# Patient Record
Sex: FEMALE | URBAN - METROPOLITAN AREA
[De-identification: names, ages, dates, MRNs, and addresses within clinical notes are randomized per-mention and may not be internally consistent; named-entity substitution may affect disease eponyms.]

---

## 2018-10-24 ENCOUNTER — APPOINTMENT (RX ONLY)
Dept: URBAN - METROPOLITAN AREA CLINIC 4 | Facility: CLINIC | Age: 68
Setting detail: DERMATOLOGY
End: 2018-10-24

## 2018-10-24 DIAGNOSIS — D22 MELANOCYTIC NEVI: ICD-10-CM

## 2018-10-24 DIAGNOSIS — L82.1 OTHER SEBORRHEIC KERATOSIS: ICD-10-CM

## 2018-10-24 DIAGNOSIS — H01.13 ECZEMATOUS DERMATITIS OF EYELID: ICD-10-CM

## 2018-10-24 DIAGNOSIS — D18.0 HEMANGIOMA: ICD-10-CM

## 2018-10-24 DIAGNOSIS — L81.4 OTHER MELANIN HYPERPIGMENTATION: ICD-10-CM

## 2018-10-24 PROBLEM — D22.5 MELANOCYTIC NEVI OF TRUNK: Status: ACTIVE | Noted: 2018-10-24

## 2018-10-24 PROBLEM — D22.61 MELANOCYTIC NEVI OF RIGHT UPPER LIMB, INCLUDING SHOULDER: Status: ACTIVE | Noted: 2018-10-24

## 2018-10-24 PROBLEM — D18.01 HEMANGIOMA OF SKIN AND SUBCUTANEOUS TISSUE: Status: ACTIVE | Noted: 2018-10-24

## 2018-10-24 PROBLEM — D22.62 MELANOCYTIC NEVI OF LEFT UPPER LIMB, INCLUDING SHOULDER: Status: ACTIVE | Noted: 2018-10-24

## 2018-10-24 PROBLEM — D22.71 MELANOCYTIC NEVI OF RIGHT LOWER LIMB, INCLUDING HIP: Status: ACTIVE | Noted: 2018-10-24

## 2018-10-24 PROBLEM — D22.72 MELANOCYTIC NEVI OF LEFT LOWER LIMB, INCLUDING HIP: Status: ACTIVE | Noted: 2018-10-24

## 2018-10-24 PROBLEM — H01.139 ECZEMATOUS DERMATITIS OF UNSPECIFIED EYE, UNSPECIFIED EYELID: Status: ACTIVE | Noted: 2018-10-24

## 2018-10-24 PROCEDURE — ? COUNSELING

## 2018-10-24 PROCEDURE — ? DIAGNOSIS COMMENT

## 2018-10-24 PROCEDURE — 99213 OFFICE O/P EST LOW 20 MIN: CPT

## 2018-10-24 ASSESSMENT — LOCATION ZONE DERM
LOCATION ZONE: FACE
LOCATION ZONE: ARM
LOCATION ZONE: TRUNK
LOCATION ZONE: LEG

## 2018-10-24 ASSESSMENT — LOCATION SIMPLE DESCRIPTION DERM
LOCATION SIMPLE: RIGHT THIGH
LOCATION SIMPLE: LEFT PRETIBIAL REGION
LOCATION SIMPLE: RIGHT UPPER ARM
LOCATION SIMPLE: RIGHT FOREARM
LOCATION SIMPLE: LEFT UPPER ARM
LOCATION SIMPLE: LEFT FOREARM
LOCATION SIMPLE: RIGHT LOWER BACK
LOCATION SIMPLE: LOWER BACK
LOCATION SIMPLE: RIGHT PRETIBIAL REGION
LOCATION SIMPLE: ABDOMEN
LOCATION SIMPLE: LEFT CHEEK
LOCATION SIMPLE: CHEST
LOCATION SIMPLE: LEFT THIGH

## 2018-10-24 ASSESSMENT — LOCATION DETAILED DESCRIPTION DERM
LOCATION DETAILED: LEFT VENTRAL PROXIMAL FOREARM
LOCATION DETAILED: LEFT ANTERIOR DISTAL UPPER ARM
LOCATION DETAILED: RIGHT PROXIMAL PRETIBIAL REGION
LOCATION DETAILED: RIGHT SUPERIOR MEDIAL MIDBACK
LOCATION DETAILED: RIGHT LATERAL ABDOMEN
LOCATION DETAILED: SUPERIOR LUMBAR SPINE
LOCATION DETAILED: RIGHT MEDIAL SUPERIOR CHEST
LOCATION DETAILED: EPIGASTRIC SKIN
LOCATION DETAILED: LEFT ANTERIOR LATERAL DISTAL THIGH
LOCATION DETAILED: RIGHT VENTRAL PROXIMAL FOREARM
LOCATION DETAILED: LEFT PROXIMAL PRETIBIAL REGION
LOCATION DETAILED: LEFT ANTERIOR PROXIMAL THIGH
LOCATION DETAILED: RIGHT ANTERIOR PROXIMAL THIGH
LOCATION DETAILED: RIGHT ANTERIOR DISTAL THIGH
LOCATION DETAILED: RIGHT ANTERIOR PROXIMAL UPPER ARM
LOCATION DETAILED: RIGHT ANTERIOR DISTAL UPPER ARM
LOCATION DETAILED: LEFT INFERIOR MEDIAL MALAR CHEEK
LOCATION DETAILED: LEFT ANTERIOR PROXIMAL UPPER ARM

## 2018-10-24 NOTE — HPI: RASH
How Severe Is Your Rash?: mild
Is This A New Presentation, Or A Follow-Up?: Follow Up Rash
Additional History: PT states not clinically presenting, also DX’d prev as Eyelid Derm. Denies any flares until she wears eye make-up. Desonide helps when she flares.\\n\\nPt also notes left eyelid lesion recurred after Dr. Copeland removed it. Reports it was a cosmetic removal - Pt unclear of removal method.

## 2018-10-24 NOTE — PROCEDURE: COUNSELING
Detail Level: Zone
Patient Specific Counseling (Will Not Stick From Patient To Patient): Suggested Bare Minerals make up as option, cannot necessarily rec best option due to not knowing true allergen

## 2019-03-07 ENCOUNTER — APPOINTMENT (RX ONLY)
Dept: URBAN - METROPOLITAN AREA CLINIC 4 | Facility: CLINIC | Age: 69
Setting detail: DERMATOLOGY
End: 2019-03-07

## 2019-03-07 DIAGNOSIS — D22 MELANOCYTIC NEVI: ICD-10-CM

## 2019-03-07 PROBLEM — D48.5 NEOPLASM OF UNCERTAIN BEHAVIOR OF SKIN: Status: ACTIVE | Noted: 2019-03-07

## 2019-03-07 PROCEDURE — 11102 TANGNTL BX SKIN SINGLE LES: CPT

## 2019-03-07 PROCEDURE — ? BIOPSY BY SHAVE METHOD

## 2019-03-07 ASSESSMENT — LOCATION SIMPLE DESCRIPTION DERM: LOCATION SIMPLE: LEFT SUPERIOR EYELID

## 2019-03-07 ASSESSMENT — LOCATION ZONE DERM: LOCATION ZONE: EYELID

## 2019-03-07 ASSESSMENT — LOCATION DETAILED DESCRIPTION DERM: LOCATION DETAILED: LEFT LATERAL SUPERIOR EYELID

## 2019-03-07 NOTE — PROCEDURE: BIOPSY BY SHAVE METHOD
Notification Instructions: Patient will be notified of biopsy results. However, patient instructed to call the office if not contacted within 2 weeks.
Detail Level: Detailed
Lab: -[id number]
Bill For Surgical Tray: no
Curettage Text: The wound bed was treated with curettage after the biopsy was performed.
Consent: Verbal consent was obtained and risks were reviewed including but not limited to scarring, infection, bleeding, scabbing, incomplete removal, nerve damage and allergy to anesthesia.
Lab Facility: [id number]
Cryotherapy Text: The wound bed was treated with cryotherapy after the biopsy was performed.
Additional Anesthesia Volume In Cc (Will Not Render If 0): 0
Anesthesia Type: 1% lidocaine with epinephrine
Wound Care: Petrolatum
Electrodesiccation Text: The wound bed was treated with electrodesiccation after the biopsy was performed.
Depth Of Biopsy: dermis
Biopsy Type: H and E
Electrodesiccation And Curettage Text: The wound bed was treated with electrodesiccation and curettage after the biopsy was performed.
Billing Type: Third-Party Bill
Type Of Destruction Used: Curettage
Anesthesia Volume In Cc (Will Not Render If 0): 0.5
Post-Care Instructions: I reviewed with the patient in detail post-care instructions. Patient is to keep the biopsy site dry overnight, and then apply vaseline or aquaphor ointment along with a bandaid twice daily until healed.
Dressing: bandage
Was A Bandage Applied: Yes
Silver Nitrate Text: The wound bed was treated with silver nitrate after the biopsy was performed.
Biopsy Method: sterile single edge surgical blade
Hemostasis: Drysol

## 2019-03-07 NOTE — HPI: SKIN LESION
How Severe Is Your Skin Lesion?: mild
Has Your Skin Lesion Been Treated?: been treated
Is This A New Presentation, Or A Follow-Up?: Skin Lesion
Additional History: Patient states has lesion removed years ago by Dr. Copeland for cosmetic reasons and is now growing back.

## 2021-06-21 ENCOUNTER — APPOINTMENT (RX ONLY)
Dept: URBAN - METROPOLITAN AREA CLINIC 4 | Facility: CLINIC | Age: 71
Setting detail: DERMATOLOGY
End: 2021-06-21

## 2021-06-21 DIAGNOSIS — H01.13 ECZEMATOUS DERMATITIS OF EYELID: ICD-10-CM

## 2021-06-21 DIAGNOSIS — T07XXXA INSECT BITE, NONVENOMOUS, OF OTHER, MULTIPLE, AND UNSPECIFIED SITES, WITHOUT MENTION OF INFECTION: ICD-10-CM

## 2021-06-21 PROBLEM — H01.131 ECZEMATOUS DERMATITIS OF RIGHT UPPER EYELID: Status: ACTIVE | Noted: 2021-06-21

## 2021-06-21 PROBLEM — S80.861A INSECT BITE (NONVENOMOUS), RIGHT LOWER LEG, INITIAL ENCOUNTER: Status: ACTIVE | Noted: 2021-06-21

## 2021-06-21 PROBLEM — H01.135 ECZEMATOUS DERMATITIS OF LEFT LOWER EYELID: Status: ACTIVE | Noted: 2021-06-21

## 2021-06-21 PROBLEM — H01.134 ECZEMATOUS DERMATITIS OF LEFT UPPER EYELID: Status: ACTIVE | Noted: 2021-06-21

## 2021-06-21 PROBLEM — H01.139 ECZEMATOUS DERMATITIS OF UNSPECIFIED EYE, UNSPECIFIED EYELID: Status: ACTIVE | Noted: 2021-06-21

## 2021-06-21 PROCEDURE — ? PRESCRIPTION

## 2021-06-21 PROCEDURE — ? DIAGNOSIS COMMENT

## 2021-06-21 PROCEDURE — ? PRESCRIPTION MEDICATION MANAGEMENT

## 2021-06-21 PROCEDURE — ? COUNSELING

## 2021-06-21 PROCEDURE — 99213 OFFICE O/P EST LOW 20 MIN: CPT

## 2021-06-21 RX ORDER — DESONIDE 0.5 MG/G
CREAM TOPICAL BID
Qty: 1 | Refills: 0 | COMMUNITY
Start: 2021-06-21

## 2021-06-21 RX ADMIN — DESONIDE: 0.5 CREAM TOPICAL at 00:00

## 2021-06-21 ASSESSMENT — LOCATION ZONE DERM
LOCATION ZONE: EYELID
LOCATION ZONE: FACE
LOCATION ZONE: LEG

## 2021-06-21 ASSESSMENT — LOCATION DETAILED DESCRIPTION DERM
LOCATION DETAILED: RIGHT LATERAL SUPERIOR EYELID
LOCATION DETAILED: LEFT LATERAL INFERIOR PRESEPTAL REGION
LOCATION DETAILED: RIGHT DISTAL PRETIBIAL REGION
LOCATION DETAILED: RIGHT SUPERIOR CENTRAL MALAR CHEEK
LOCATION DETAILED: LEFT LATERAL SUPERIOR EYELID

## 2021-06-21 ASSESSMENT — LOCATION SIMPLE DESCRIPTION DERM
LOCATION SIMPLE: LEFT SUPERIOR EYELID
LOCATION SIMPLE: RIGHT CHEEK
LOCATION SIMPLE: LEFT INFERIOR EYELID
LOCATION SIMPLE: RIGHT PRETIBIAL REGION
LOCATION SIMPLE: RIGHT SUPERIOR EYELID

## 2021-06-21 NOTE — HPI: SKIN LESION
What Type Of Note Output Would You Prefer (Optional)?: Bullet Format
How Severe Is Your Skin Lesion?: mild
Has Your Skin Lesion Been Treated?: not been treated
Is This A New Presentation, Or A Follow-Up?: Skin Lesion
Additional History: Pt thinks she had a bug bite. She is concerned that lesion hasn’t fully healed.

## 2021-06-21 NOTE — HPI: RASH
What Type Of Note Output Would You Prefer (Optional)?: Bullet Format
How Severe Is Your Rash?: mild
Is This A New Presentation, Or A Follow-Up?: Rash
Additional History: Pt saw her ophthalmologist 2 weeks ago and was prescribed alaway. She states her doctor though it was due to a pollen allergy. Pt states she tried an old  desonide cream on her eyelids for previous diagnosis of eyelid dermatitis in 2018. She notes the rash began after her eyes were frequently tearing up. She notes she has dry eye and uses refresh eye drops and systane eye drops. Eyelid issues seemed to start after start of these eye drops.

## 2021-06-21 NOTE — PROCEDURE: DIAGNOSIS COMMENT
Render Risk Assessment In Note?: no
Comment: RTC if the lesion does not resolve after 1 month.
Detail Level: Simple
Comment: Suspect secondary to propylene glycol (systane) or benzalkonium chloride (alaway)

## 2021-06-21 NOTE — PROCEDURE: PRESCRIPTION MEDICATION MANAGEMENT
Continue Regimen: Refresh eye drops
Discontinue Regimen: Systane eye drops, alaway eye drops
Render In Strict Bullet Format?: No
Plan: F/u if not improved after 2 weeks
Initiate Treatment: Desonide BID x 1 week then QOD x 1 week then stop
Detail Level: Simple

## 2021-08-18 ENCOUNTER — APPOINTMENT (RX ONLY)
Dept: URBAN - METROPOLITAN AREA CLINIC 4 | Facility: CLINIC | Age: 71
Setting detail: DERMATOLOGY
End: 2021-08-18

## 2021-08-18 DIAGNOSIS — L57.0 ACTINIC KERATOSIS: ICD-10-CM

## 2021-08-18 DIAGNOSIS — H01.13 ECZEMATOUS DERMATITIS OF EYELID: ICD-10-CM

## 2021-08-18 DIAGNOSIS — L81.4 OTHER MELANIN HYPERPIGMENTATION: ICD-10-CM

## 2021-08-18 DIAGNOSIS — L82.1 OTHER SEBORRHEIC KERATOSIS: ICD-10-CM

## 2021-08-18 DIAGNOSIS — L82.0 INFLAMED SEBORRHEIC KERATOSIS: ICD-10-CM

## 2021-08-18 DIAGNOSIS — D22 MELANOCYTIC NEVI: ICD-10-CM

## 2021-08-18 PROBLEM — D22.72 MELANOCYTIC NEVI OF LEFT LOWER LIMB, INCLUDING HIP: Status: ACTIVE | Noted: 2021-08-18

## 2021-08-18 PROBLEM — H01.139 ECZEMATOUS DERMATITIS OF UNSPECIFIED EYE, UNSPECIFIED EYELID: Status: ACTIVE | Noted: 2021-08-18

## 2021-08-18 PROBLEM — D22.71 MELANOCYTIC NEVI OF RIGHT LOWER LIMB, INCLUDING HIP: Status: ACTIVE | Noted: 2021-08-18

## 2021-08-18 PROBLEM — D22.62 MELANOCYTIC NEVI OF LEFT UPPER LIMB, INCLUDING SHOULDER: Status: ACTIVE | Noted: 2021-08-18

## 2021-08-18 PROBLEM — D22.5 MELANOCYTIC NEVI OF TRUNK: Status: ACTIVE | Noted: 2021-08-18

## 2021-08-18 PROBLEM — D22.61 MELANOCYTIC NEVI OF RIGHT UPPER LIMB, INCLUDING SHOULDER: Status: ACTIVE | Noted: 2021-08-18

## 2021-08-18 PROCEDURE — 99213 OFFICE O/P EST LOW 20 MIN: CPT | Mod: 25

## 2021-08-18 PROCEDURE — ? PRESCRIPTION MEDICATION MANAGEMENT

## 2021-08-18 PROCEDURE — ? PRESCRIPTION

## 2021-08-18 PROCEDURE — ? LIQUID NITROGEN

## 2021-08-18 PROCEDURE — 17110 DESTRUCTION B9 LES UP TO 14: CPT

## 2021-08-18 PROCEDURE — 17000 DESTRUCT PREMALG LESION: CPT | Mod: 59

## 2021-08-18 PROCEDURE — ? DIAGNOSIS COMMENT

## 2021-08-18 PROCEDURE — ? COUNSELING

## 2021-08-18 RX ORDER — TACROLIMUS 1 MG/G
OINTMENT TOPICAL
Qty: 30 | Refills: 2 | Status: ERX | COMMUNITY
Start: 2021-08-18

## 2021-08-18 RX ADMIN — TACROLIMUS: 1 OINTMENT TOPICAL at 00:00

## 2021-08-18 ASSESSMENT — LOCATION DETAILED DESCRIPTION DERM
LOCATION DETAILED: RIGHT ANTERIOR PROXIMAL UPPER ARM
LOCATION DETAILED: LEFT ANTERIOR DISTAL UPPER ARM
LOCATION DETAILED: LEFT ANTERIOR PROXIMAL UPPER ARM
LOCATION DETAILED: RIGHT ANTERIOR DISTAL THIGH
LOCATION DETAILED: LEFT PROXIMAL PRETIBIAL REGION
LOCATION DETAILED: RIGHT MEDIAL UPPER BACK
LOCATION DETAILED: RIGHT SUPERIOR MEDIAL MIDBACK
LOCATION DETAILED: LEFT INFERIOR MEDIAL MALAR CHEEK
LOCATION DETAILED: RIGHT VENTRAL PROXIMAL FOREARM
LOCATION DETAILED: EPIGASTRIC SKIN
LOCATION DETAILED: RIGHT LATERAL ABDOMEN
LOCATION DETAILED: RIGHT ANTERIOR DISTAL UPPER ARM
LOCATION DETAILED: RIGHT PROXIMAL PRETIBIAL REGION
LOCATION DETAILED: RIGHT CRUS OF HELIX
LOCATION DETAILED: RIGHT ANTERIOR PROXIMAL THIGH
LOCATION DETAILED: LEFT INFERIOR MEDIAL UPPER BACK
LOCATION DETAILED: LEFT ANTERIOR LATERAL DISTAL THIGH
LOCATION DETAILED: LEFT INFERIOR UPPER BACK
LOCATION DETAILED: LEFT VENTRAL PROXIMAL FOREARM
LOCATION DETAILED: LEFT ANTERIOR PROXIMAL THIGH
LOCATION DETAILED: RIGHT MEDIAL SUPERIOR CHEST

## 2021-08-18 ASSESSMENT — LOCATION ZONE DERM
LOCATION ZONE: EAR
LOCATION ZONE: TRUNK
LOCATION ZONE: LEG
LOCATION ZONE: ARM
LOCATION ZONE: FACE

## 2021-08-18 ASSESSMENT — LOCATION SIMPLE DESCRIPTION DERM
LOCATION SIMPLE: RIGHT THIGH
LOCATION SIMPLE: RIGHT FOREARM
LOCATION SIMPLE: LEFT THIGH
LOCATION SIMPLE: CHEST
LOCATION SIMPLE: RIGHT LOWER BACK
LOCATION SIMPLE: LEFT PRETIBIAL REGION
LOCATION SIMPLE: RIGHT EAR
LOCATION SIMPLE: RIGHT UPPER ARM
LOCATION SIMPLE: RIGHT PRETIBIAL REGION
LOCATION SIMPLE: LEFT UPPER ARM
LOCATION SIMPLE: RIGHT UPPER BACK
LOCATION SIMPLE: ABDOMEN
LOCATION SIMPLE: LEFT FOREARM
LOCATION SIMPLE: LEFT CHEEK
LOCATION SIMPLE: LEFT UPPER BACK

## 2021-08-18 NOTE — HPI: ITCHING
How Did Your Itching Occur?: gradual in onset  (over a period of years)
Additional History: Pt uses Dove soap

## 2021-08-18 NOTE — PROCEDURE: PRESCRIPTION MEDICATION MANAGEMENT
Plan: Follow up in 6 weeks if persists \\nF/u Ophtho for management of frequent tearing- suspect irritant contact dermatitis
Initiate Treatment: Tacrolimus ointment Monday - Friday
Modify Regimen: Desonide cream Sat & Sun for 1 month\\nContinue to avoid benzalkonium chloride and propylene glycol
Render In Strict Bullet Format?: No
Detail Level: Simple

## 2021-08-18 NOTE — PROCEDURE: COUNSELING
Detail Level: Zone
Patient Specific Counseling (Will Not Stick From Patient To Patient): Suggested Bare Minerals make up as option, cannot necessarily rec best option due to not knowing true allergen
Detail Level: Detailed

## 2021-08-18 NOTE — PROCEDURE: LIQUID NITROGEN
Show Applicator Variable?: Yes
Consent: The patient's consent was obtained including but not limited to risks of crusting, scabbing, blistering, scarring, darker or lighter pigmentary change, recurrence, incomplete removal and infection.
Detail Level: Detailed
Post-Care Instructions: I reviewed with the patient in detail post-care instructions. Patient is to wear sunprotection, and avoid picking at any of the treated lesions. Pt may apply Vaseline to crusted or scabbing areas. I have advised Pt to return to the office in 4 weeks if lesions persist.
Duration Of Freeze Thaw-Cycle (Seconds): 0
Render Note In Bullet Format When Appropriate: No
Medical Necessity Information: It is in your best interest to select a reason for this procedure from the list below. All of these items fulfill various CMS LCD requirements except the new and changing color options.
Post-Care Instructions: I reviewed with the patient in detail post-care instructions. Patient is to wear sunprotection, and avoid picking at any of the treated lesions. Pt may apply Vaseline to crusted or scabbing areas.
Medical Necessity Clause: This procedure was medically necessary because the lesions that were treated were:

## 2021-08-18 NOTE — PROCEDURE: DIAGNOSIS COMMENT
Comment: Including lesion of concern in R ear
Detail Level: Simple
Render Risk Assessment In Note?: no
Comment: Including itchy and irritating concerns on back

## 2022-08-18 ENCOUNTER — APPOINTMENT (RX ONLY)
Dept: URBAN - METROPOLITAN AREA CLINIC 4 | Facility: CLINIC | Age: 72
Setting detail: DERMATOLOGY
End: 2022-08-18

## 2022-08-18 DIAGNOSIS — L81.4 OTHER MELANIN HYPERPIGMENTATION: ICD-10-CM

## 2022-08-18 DIAGNOSIS — D22 MELANOCYTIC NEVI: ICD-10-CM

## 2022-08-18 DIAGNOSIS — L82.1 OTHER SEBORRHEIC KERATOSIS: ICD-10-CM

## 2022-08-18 DIAGNOSIS — Z00.00 ENCOUNTER FOR GENERAL ADULT MEDICAL EXAMINATION WITHOUT ABNORMAL FINDINGS: ICD-10-CM

## 2022-08-18 DIAGNOSIS — H01.13 ECZEMATOUS DERMATITIS OF EYELID: ICD-10-CM | Status: RESOLVED

## 2022-08-18 PROBLEM — D22.72 MELANOCYTIC NEVI OF LEFT LOWER LIMB, INCLUDING HIP: Status: ACTIVE | Noted: 2022-08-18

## 2022-08-18 PROBLEM — D22.5 MELANOCYTIC NEVI OF TRUNK: Status: ACTIVE | Noted: 2022-08-18

## 2022-08-18 PROBLEM — D22.71 MELANOCYTIC NEVI OF RIGHT LOWER LIMB, INCLUDING HIP: Status: ACTIVE | Noted: 2022-08-18

## 2022-08-18 PROBLEM — D22.61 MELANOCYTIC NEVI OF RIGHT UPPER LIMB, INCLUDING SHOULDER: Status: ACTIVE | Noted: 2022-08-18

## 2022-08-18 PROBLEM — D22.62 MELANOCYTIC NEVI OF LEFT UPPER LIMB, INCLUDING SHOULDER: Status: ACTIVE | Noted: 2022-08-18

## 2022-08-18 PROBLEM — H01.139 ECZEMATOUS DERMATITIS OF UNSPECIFIED EYE, UNSPECIFIED EYELID: Status: ACTIVE | Noted: 2022-08-18

## 2022-08-18 PROCEDURE — ? SUNSCREEN RECOMMENDATIONS

## 2022-08-18 PROCEDURE — 99213 OFFICE O/P EST LOW 20 MIN: CPT

## 2022-08-18 PROCEDURE — ? COUNSELING

## 2022-08-18 PROCEDURE — ? DIAGNOSIS COMMENT

## 2022-08-18 ASSESSMENT — LOCATION SIMPLE DESCRIPTION DERM
LOCATION SIMPLE: LEFT CHEEK
LOCATION SIMPLE: LEFT UPPER ARM
LOCATION SIMPLE: LEFT THIGH
LOCATION SIMPLE: RIGHT PRETIBIAL REGION
LOCATION SIMPLE: RIGHT THIGH
LOCATION SIMPLE: ABDOMEN
LOCATION SIMPLE: CHEST
LOCATION SIMPLE: RIGHT LOWER BACK
LOCATION SIMPLE: LEFT PRETIBIAL REGION
LOCATION SIMPLE: RIGHT UPPER ARM
LOCATION SIMPLE: LEFT FOREARM
LOCATION SIMPLE: RIGHT FOREARM

## 2022-08-18 ASSESSMENT — LOCATION DETAILED DESCRIPTION DERM
LOCATION DETAILED: RIGHT ANTERIOR DISTAL UPPER ARM
LOCATION DETAILED: LEFT ANTERIOR PROXIMAL UPPER ARM
LOCATION DETAILED: LEFT ANTERIOR DISTAL UPPER ARM
LOCATION DETAILED: EPIGASTRIC SKIN
LOCATION DETAILED: RIGHT PROXIMAL PRETIBIAL REGION
LOCATION DETAILED: LEFT ANTERIOR PROXIMAL THIGH
LOCATION DETAILED: RIGHT ANTERIOR DISTAL THIGH
LOCATION DETAILED: RIGHT MEDIAL SUPERIOR CHEST
LOCATION DETAILED: LEFT ANTERIOR LATERAL DISTAL THIGH
LOCATION DETAILED: LEFT VENTRAL PROXIMAL FOREARM
LOCATION DETAILED: RIGHT LATERAL ABDOMEN
LOCATION DETAILED: RIGHT SUPERIOR MEDIAL MIDBACK
LOCATION DETAILED: LEFT INFERIOR MEDIAL MALAR CHEEK
LOCATION DETAILED: RIGHT ANTERIOR PROXIMAL UPPER ARM
LOCATION DETAILED: RIGHT ANTERIOR PROXIMAL THIGH
LOCATION DETAILED: RIGHT VENTRAL PROXIMAL FOREARM
LOCATION DETAILED: LEFT PROXIMAL PRETIBIAL REGION

## 2022-08-18 ASSESSMENT — LOCATION ZONE DERM
LOCATION ZONE: TRUNK
LOCATION ZONE: FACE
LOCATION ZONE: LEG
LOCATION ZONE: ARM

## 2022-08-18 NOTE — PROCEDURE: DIAGNOSIS COMMENT
Render Risk Assessment In Note?: no
Comment: Referred patient to PCM, suspect musculoskeletal in nature. Including area of concern.
Detail Level: Simple

## 2022-08-18 NOTE — PROCEDURE: MIPS QUALITY
Quality 111:Pneumonia Vaccination Status For Older Adults: Pneumococcal vaccine administered on or after patient’s 60th birthday and before the end of the measurement period
Quality 130: Documentation Of Current Medications In The Medical Record: Current Medications Documented
Detail Level: Detailed
Quality 110: Preventive Care And Screening: Influenza Immunization: Influenza Immunization previously received during influenza season
Quality 226: Preventive Care And Screening: Tobacco Use: Screening And Cessation Intervention: Tobacco Screening not Performed for Medical Reasons

## 2022-08-18 NOTE — HPI: SKIN LESION
What Type Of Note Output Would You Prefer (Optional)?: Bullet Format
How Severe Is Your Skin Lesion?: mild
Has Your Skin Lesion Been Treated?: not been treated
Is This A New Presentation, Or A Follow-Up?: Skin Lesion
Additional History: Pt noticed that toe is sore. No lesions.

## 2024-09-10 ENCOUNTER — PRE-ADMISSION TESTING (OUTPATIENT)
Dept: PREADMISSION TESTING | Age: 74
End: 2024-09-10
Payer: MEDICARE

## 2024-09-10 ENCOUNTER — TRANSCRIBE ORDERS (OUTPATIENT)
Dept: PREADMISSION TESTING | Facility: HOSPITAL | Age: 74
End: 2024-09-10

## 2024-09-10 VITALS — WEIGHT: 147 LBS | BODY MASS INDEX: 25.1 KG/M2 | HEIGHT: 64 IN

## 2024-09-10 DIAGNOSIS — M16.12 UNILATERAL PRIMARY OSTEOARTHRITIS, LEFT HIP: Primary | ICD-10-CM

## 2024-09-10 DIAGNOSIS — Z01.818 ENCOUNTER FOR OTHER PREPROCEDURAL EXAMINATION: ICD-10-CM

## 2024-09-10 RX ORDER — ROSUVASTATIN CALCIUM 10 MG/1
10 TABLET, COATED ORAL
COMMUNITY
Start: 2024-07-15

## 2024-09-10 RX ORDER — ESCITALOPRAM OXALATE 5 MG/1
5 TABLET ORAL EVERY MORNING
COMMUNITY

## 2024-09-10 NOTE — PRE-PROCEDURE INSTRUCTIONS
[facility]  [address] [place] [address]    1.       Admissions will call you with your arrival time on  9/16 (day prior to surgery) between 2pm-4pm.  For questions about your arrival time, please call [phone].    2.       On the day of your procedure please report to the [place] campus in the [place]. Please arrive through the [place] [facility].  If you are parking in the [facility] [address] [facility], come to the ground floor of the garage and follow signs to the [facility].  After being screened, please report to either the Outpatient Registration for Pre-Admission Testing or to the Surgery Registration Desk.    3. Please follow the following fasting guidelines:     No solid food EIGHT HOURS prior to arrival time on day of surgery.  6 ounces of clear liquids, meaning water or PLAIN black coffee WITHOUT any milk, cream, sugar, or sweetener are permitted up to TWO HOURS prior to arrival at the hospital.    4. Please take ONLY the following medications with a sip of water on the morning of your procedure: (populate names and/or NONE)  escitalopram    5. Other Instructions: You may brush your teeth the morning of the procedure. Rinse and spit, do not swallow.  Bring a list of your medications with dosages.  Use surgical wash as directed.     6. If you develop a cold, cough, fever, rash, or other symptom prior to the day of the procedure, please report it to your physician immediately.    7. If you need to cancel the procedure for any reason, please contact your physician.    8. Make arrangements to have safe transportation home accompanied by a responsible adult. If you have not arranged safe transportation home, your surgery will be cancelled. Safe transportation may include private vehicle, ride-share service, taxi and public transportation when accompanied by a responsible adult who will assist you home. A responsible adult is someone known to you and does not include the  taxi, ride-share or public transit drive transporting you.    9.  If it is medically necessary for you to have a longer stay, you will be informed as soon as the decision is made.    10. Only bring essential items to the hospital.  Do not wear or bring anything of value to the hospital including jewelry of any kind, money, or wallet. Do not wear make-up or contact lenses.  DO NOT BRING MEDICATIONS FROM HOME unless instructed to do so. DO bring your hearing aids, glasses, and a case    11. No lotion, creams, powders, or oils on skin the morning of procedure     12. Dress in comfortable clothes.    13.  If instructed, please bring a copy of your Advanced Directive (Living Will/Durable Power of ) on the day of your procedure.     14. Ensuring your safety at all times is a very important part of our [facility] Culture of Safety. After having surgery and sedation, you are at risk for falling and balance issues. Although you may feel awake, the effects of the medication can last up to 24 hours after anesthesia. If you need to use the bathroom during your recovery period, nursing staff will escort you there and stay with you to ensure your safety.    15. Refrain from drinking alcohol and smoking cigarettes for 24 hours prior to surgery.    16. Shower with antibacterial soap (Dial) the night before and morning of your procedure.  If your procedure indicates the need for CHG antiseptic wash (Bactoshield or Hibiclens), please use this instead and follow instructions as discussed at the time of your Pre-Admission Testing phone interview or visit.    Above instructions reviewed with patient and patient acknowledges understanding.    Form explained by: Fany Geronimo RN

## 2024-09-11 ENCOUNTER — PRE-ADMISSION TESTING (OUTPATIENT)
Dept: PREADMISSION TESTING | Facility: HOSPITAL | Age: 74
End: 2024-09-11
Attending: ORTHOPAEDIC SURGERY
Payer: MEDICARE

## 2024-09-11 ENCOUNTER — HOSPITAL ENCOUNTER (OUTPATIENT)
Dept: CARDIOLOGY | Facility: HOSPITAL | Age: 74
Discharge: HOME | End: 2024-09-11
Attending: ORTHOPAEDIC SURGERY
Payer: MEDICARE

## 2024-09-11 VITALS
WEIGHT: 150.1 LBS | SYSTOLIC BLOOD PRESSURE: 158 MMHG | HEIGHT: 64 IN | HEART RATE: 68 BPM | RESPIRATION RATE: 16 BRPM | TEMPERATURE: 97.5 F | BODY MASS INDEX: 25.62 KG/M2 | DIASTOLIC BLOOD PRESSURE: 78 MMHG | OXYGEN SATURATION: 98 %

## 2024-09-11 DIAGNOSIS — R03.0 ELEVATED BLOOD PRESSURE READING WITHOUT DIAGNOSIS OF HYPERTENSION: ICD-10-CM

## 2024-09-11 DIAGNOSIS — Z01.818 ENCOUNTER FOR PRE-OPERATIVE EXAMINATION: Primary | ICD-10-CM

## 2024-09-11 DIAGNOSIS — Z01.818 ENCOUNTER FOR OTHER PREPROCEDURAL EXAMINATION: ICD-10-CM

## 2024-09-11 DIAGNOSIS — M16.12 UNILATERAL PRIMARY OSTEOARTHRITIS, LEFT HIP: ICD-10-CM

## 2024-09-11 PROBLEM — F32.A DEPRESSION: Status: ACTIVE | Noted: 2024-09-11

## 2024-09-11 PROBLEM — E78.5 HYPERLIPIDEMIA: Status: ACTIVE | Noted: 2024-09-11

## 2024-09-11 LAB
ABO + RH BLD: NORMAL
ANION GAP SERPL CALC-SCNC: 5 MEQ/L (ref 3–15)
BLD GP AB SCN SERPL QL: NEGATIVE
BLOOD BANK CMNT PATIENT-IMP: NORMAL
BUN SERPL-MCNC: 23 MG/DL (ref 7–25)
CALCIUM SERPL-MCNC: 9 MG/DL (ref 8.6–10.3)
CHLORIDE SERPL-SCNC: 107 MEQ/L (ref 98–107)
CO2 SERPL-SCNC: 27 MEQ/L (ref 21–31)
CREAT SERPL-MCNC: 0.9 MG/DL (ref 0.6–1.2)
D AG BLD QL: NEGATIVE
EGFRCR SERPLBLD CKD-EPI 2021: >60 ML/MIN/1.73M*2
ERYTHROCYTE [DISTWIDTH] IN BLOOD BY AUTOMATED COUNT: 13.8 % (ref 11.7–14.4)
GLUCOSE SERPL-MCNC: 91 MG/DL (ref 70–99)
HCT VFR BLD AUTO: 38.7 % (ref 35–45)
HGB BLD-MCNC: 12.5 G/DL (ref 11.8–15.7)
LABORATORY COMMENT REPORT: NORMAL
MCH RBC QN AUTO: 29.1 PG (ref 28–33.2)
MCHC RBC AUTO-ENTMCNC: 32.3 G/DL (ref 32.2–35.5)
MCV RBC AUTO: 90.2 FL (ref 83–98)
PDW BLD AUTO: 10.3 FL (ref 9.4–12.3)
PLATELET # BLD AUTO: 187 K/UL (ref 150–369)
POTASSIUM SERPL-SCNC: 3.8 MEQ/L (ref 3.5–5.1)
RBC # BLD AUTO: 4.29 M/UL (ref 3.93–5.22)
SODIUM SERPL-SCNC: 139 MEQ/L (ref 136–145)
SPECIMEN EXP DATE BLD: NORMAL
WBC # BLD AUTO: 7.05 K/UL (ref 3.8–10.5)

## 2024-09-11 PROCEDURE — 36415 COLL VENOUS BLD VENIPUNCTURE: CPT

## 2024-09-11 PROCEDURE — 80048 BASIC METABOLIC PNL TOTAL CA: CPT

## 2024-09-11 PROCEDURE — 86901 BLOOD TYPING SEROLOGIC RH(D): CPT

## 2024-09-11 PROCEDURE — 93005 ELECTROCARDIOGRAM TRACING: CPT

## 2024-09-11 PROCEDURE — 99205 OFFICE O/P NEW HI 60 MIN: CPT | Mod: 25 | Performed by: INTERNAL MEDICINE

## 2024-09-11 PROCEDURE — 99204 OFFICE O/P NEW MOD 45 MIN: CPT | Performed by: HOSPITALIST

## 2024-09-11 PROCEDURE — 85027 COMPLETE CBC AUTOMATED: CPT

## 2024-09-11 NOTE — CONSULTS
[facility] Cardiology  [facility] Consult Note       Reason for consult: Preoperative cardiovascular risk assessment     HPI     Shahnaz Tejada is a 74 y.o. female with a past medical history significant for anxiety, depression, hyperlipidemia, former tobacco use (smoked for a total 8-10 years, quit over 10 years ago) and arthritis.  She presents today for preadmission testing, our service was consulted for preoperative cardiovascular risk assessment given concerns of intermittent chest pain.    She is scheduled for right total hip arthroplasty with Dr. Jocelyn Danielson on 9/17/2024.     At baseline she reports chest pain that has been intermittent with no particular pattern ongoing for the last few years.  She said she previously saw a cardiologist over 5 years ago and had a prior stress test which was normal.  She says her blood pressure has been elevated on occasion but she has no known history of hypertension and has not been on antihypertensives in the past.  She has been on cholesterol medication for some time now.  She denies any episodes of chest pain, palpitations, dizziness, lightness, orthopnea, PND or LE edema.  She says at baseline she was much more active with doing aerobic activity.  Given her hip discomfort and her activity has more recently been limited but as of a month ago she was able to do a 30-minute exercise video with aerobic exercising.  She was able to go up a full flight of stairs in her home without any significant symptoms and is able to perform all of her ADLs.  She denies any exertional chest discomfort.  She reports a former history of tobacco use, says it was for a total of 8 to 10 years, quitting over 10 years ago.  She says at most she smoked a pack a day but this was not throughout the whole time of her 8 to 10 years of smoking.    ROS: As noted per HPI    Past History      Past Medical History:   Diagnosis Date    Anxiety     Arthritis     Depression     Hard of hearing   "   Hypoglycemia     Lipid disorder     Transfusion history     no reaction       Past Surgical History:   Procedure Laterality Date    CATARACT EXTRACTION W/  INTRAOCULAR LENS IMPLANT Bilateral     COLONOSCOPY      DILATION AND CURETTAGE OF UTERUS      NASAL SEPTUM SURGERY      PELVIC LAPAROSCOPY         Social History     Social History Narrative    Not on file       Family History   Problem Relation Age of Onset    Cancer Biological Mother         Medications     Medications prior to admission:  No medications prior to admission.       Scheduled Inpatient Medications:      Scheduled Inpatient Infusions:  No current facility-administered medications for this encounter.        Allergies     Latex     Vital Signs/Exam     Vital signs in last 24 hours:  Temp:  [36.4 °C (97.5 °F)] 36.4 °C (97.5 °F)  Heart Rate:  [68] 68  Resp:  [16] 16  BP: (158)/(78) 158/78    I/O  No intake or output data in the 24 hours ending 09/11/24 1434    Weight  Weight change:     Physical Exam:  There were no vitals taken for this visit.    Gen: no distress  HEENT: no jvd, no carotid bruits   Lungs: clear bilaterally; no crackles, rhonchi, wheezing  Chest wall: no tenderness  Heart: regular rate and rhythm; no murmur  Abdomen: nondistended, nontender  Extremities: no edema  Neuro: nonfocal, alert and oriented  Psych: normal mood and affect     Diagnostic Data   Diagnostic data personally reviewed.    Labs:              No results found for: \"HSTROPONINI\"                            Imaging last 24 hrs:   No results found.    Vascular Studies:  No results found for this or any previous visit from the past 365 days.        Peripheral:  No results found for this or any previous visit from the past 365 days.      Stress Tests:             Cardiac cath:      Echocardiogram:      ECG: Independently reviewed:            Assessment and Plan    CODE STATUS: No Order    There are no hospital problems to display for this patient.      Shahnaz Tejada is a " 74 y.o. female with a past medical history significant for anxiety, depression, hyperlipidemia, former tobacco use (smoked for a total 8-10 years, quit over 10 years ago) and arthritis.  She presents today for preadmission testing, our service was consulted for preoperative cardiovascular risk assessment given concerns of intermittent chest pain.    Outpatient cardiac medications: Crestor 10mg daily     Preoperative cardiovascular risk assessment   -Presents preadmission testing, our service was asked to evaluate given intermittent chest pain  -Report atypical chest pain that is nonexertional and has been ongoing for years, denies any shortness of breath with exertion   -Able to perform greater than 4 METS of activity, cardiac exam and EKG are unremarkable  -In the absence of decompensated heart failure, malignant arrhythmias, unstable angina, or significant valvular disease she is an acceptable risk for her scheduled right total hip arthroplasty without any additional cardiac testing  -Given atypical symptoms, would recommend routine outpatient cardiology follow up ~ this does not need to be completed prior to her surgery     Hyperlipidemia   -Continue statin     Thank you for this consultation.         Patient was seen and evaluated in conjunction with ESTEPHANIA Lamar    [facility]iology, Main Office: [phone]  [facility]   Pager #[phone]  9/11/2024

## 2024-09-11 NOTE — H&P (VIEW-ONLY)
[facility] Medicine Service -  Pre-Operative Consultation       Patient Name: Shahnaz Tejada  Referring Surgeon: Dr. Jocelyn Danielson    Reason for Referral: Pre-Operative Evaluation  Surgical Procedure: Right total hip arthroplasty  Operative Date: 09/17/2024  Other Providers:      PCP: Unable, To Obtain Pcp          HISTORY OF PRESENT ILLNESS      Shahnaz Tejada is a 74 y.o. female presenting today to the [facility] at [facility] for pre-operative evaluation prior to planned surgery.    Complains of progressive right hip pain.  No relief with conservative measures and pain impacting her mobility, day-to-day activities so opting for surgery.    In regards to medical history:      The patient denies any current or recent dyspnea, cough, sputum, fevers, chills, abdominal pain, nausea, vomiting, diarrhea or other symptoms.  No urinary symptoms, no bright red blood per rectum or melena.    Does report intermittent left-sided chest pain.  Denies any exertional component  Overall her activity levels limited but can go up a flight of stairs at a slow to moderate pace without any exertional chest pain or dyspnea    The patient denies, on specific questioning, the following:  No history of MI, arrhythmia,or CHF.  No history of NESS.  No history of DVT/PE.  No history of COPD.  No history of CVA.  No history of DM.   No history of CKD.     PAST MEDICAL AND SURGICAL HISTORY      Past Medical History:   Diagnosis Date    Anxiety     Arthritis     Depression     Hard of hearing     Hypoglycemia     Lipid disorder     Transfusion history     no reaction       Past Surgical History:   Procedure Laterality Date    CATARACT EXTRACTION W/  INTRAOCULAR LENS IMPLANT Bilateral     COLONOSCOPY      DILATION AND CURETTAGE OF UTERUS      NASAL SEPTUM SURGERY      PELVIC LAPAROSCOPY         MEDICATIONS        Current Outpatient Medications:     escitalopram (LEXAPRO) 5 mg tablet, Take  "5 mg by mouth every morning., Disp: , Rfl:     rosuvastatin (CRESTOR) 10 mg tablet, Take 10 mg by mouth daily., Disp: , Rfl:     ALLERGIES      Latex    FAMILY HISTORY      family history includes Cancer in her biological mother.    Denies any prior known family history of DVTs/PEs/clotting disorder    SOCIAL HISTORY      Social History     Tobacco Use    Smoking status: Former     Types: Cigarettes    Smokeless tobacco: Never   Vaping Use    Vaping Use: Never used   Substance Use Topics    Alcohol use: Not Currently     Comment: wine    Drug use: Never       REVIEW OF SYSTEMS      All other systems reviewed and negative except as noted in HPI    PHYSICAL EXAMINATION      Visit Vitals  BP (!) 158/78 (BP Location: Left upper arm, Patient Position: Sitting)   Pulse 68   Temp 36.4 °C (97.5 °F) (Temporal)   Resp 16   Ht 1.626 m (5' 4\")   Wt 68.1 kg (150 lb 1.6 oz)   SpO2 98%   BMI 25.76 kg/m²     Body mass index is 25.76 kg/m².    Physical Exam  Constitutional:       Appearance: Normal appearance.   HENT:      Head: Normocephalic and atraumatic.   Eyes:      Conjunctiva/sclera: Conjunctivae normal.   Cardiovascular:      Rate and Rhythm: Normal rate and regular rhythm.      Heart sounds: Normal heart sounds.   Pulmonary:      Effort: Pulmonary effort is normal.      Breath sounds: Normal breath sounds.   Abdominal:      General: Bowel sounds are normal. There is no distension.      Palpations: Abdomen is soft.      Tenderness: There is no abdominal tenderness.   Musculoskeletal:      Cervical back: Neck supple.      Comments: Right hip limited ROM from pain.   Skin:     General: Skin is warm and dry.   Neurological:      General: No focal deficit present.      Mental Status: She is alert and oriented to person, place, and time.   Psychiatric:         Behavior: Behavior normal.         LABS / EKG        Labs  reviewed    Lab Results   Component Value Date     09/11/2024    K 3.8 09/11/2024     09/11/2024    " BUN 23 09/11/2024    CREATININE 0.9 09/11/2024    WBC 7.05 09/11/2024    HGB 12.5 09/11/2024    HCT 38.7 09/11/2024     09/11/2024         ECG/Telemetry  ECG personally reviewed: Normal sinus rhythm    ASSESSMENT AND PLAN         Encounter for pre-operative examination  Reports good exercise capacity able to reach greater than 4 mets activity levels.  Denies any prior cardiac history. No hx of TIA/CVA  Reports intermittent left-sided chest pain but denies any exertional component.  Chest pain appears to be atypical for cardiac etiology but will recommend cardiology consult preop for surgical clearance  ECG without signs of ischemia.  Preop labs within acceptable limits.    P:  If patient cleared by cardiology then she may proceed to surgery without additional testing  NSAID use preop per surgery team recommendations    Hyperlipidemia  Continue statin    Depression  Continue Lexapro    Elevated blood pressure reading without diagnosis of hypertension  Mild to moderately elevated this visit. Probably some exacerbation from her right hip pain, anxiety/stress  Instructed patient to monitor blood pressures at home and if persistent elevations noted to reach out to her PCP  Monitor blood pressures postop       In regards to perioperative cardiac risk:  Regarding perioperative cardiovascular risk:  RCRI Score: 0  Risk of major cardiac event (95% CI): Class I Risk. 3.9% (2.8-5.4%) 30-day risk of death, MI, or cardiac arrest        Further comments:  Resume supplements when OK with surgical team.  I would encourage incentive spirometry to assist with minimizing jada-operative pulmonary risk.  DVT prophylaxis and timing of such per the discretion of the surgeon.     Please do not hesitate to contact [facility] during the upcoming hospitalization with any questions or concerns.     Rasheed Yang MD  9/12/2024

## 2024-09-11 NOTE — ASSESSMENT & PLAN NOTE
Mild to moderately elevated this visit. Probably some exacerbation from her right hip pain, anxiety/stress  Instructed patient to monitor blood pressures at home and if persistent elevations noted to reach out to her PCP  Monitor blood pressures postop

## 2024-09-11 NOTE — ASSESSMENT & PLAN NOTE
Reports good exercise capacity able to reach greater than 4 mets activity levels.  Denies any prior cardiac history. No hx of TIA/CVA  Reports intermittent left-sided chest pain but denies any exertional component.  Chest pain appears to be atypical for cardiac etiology but will recommend cardiology consult preop for surgical clearance  ECG without signs of ischemia.  Preop labs within acceptable limits.    P:  If patient cleared by cardiology then she may proceed to surgery without additional testing  NSAID use preop per surgery team recommendations

## 2024-09-11 NOTE — CONSULTS
[facility] Medicine Service -  Pre-Operative Consultation       Patient Name: Shahnaz Tejada  Referring Surgeon: Dr. Jocelyn Danielson    Reason for Referral: Pre-Operative Evaluation  Surgical Procedure: Right total hip arthroplasty  Operative Date: 09/17/2024  Other Providers:      PCP: Unable, To Obtain Pcp          HISTORY OF PRESENT ILLNESS      Shahnaz Tejada is a 74 y.o. female presenting today to the [facility] at [facility] for pre-operative evaluation prior to planned surgery.    Complains of progressive right hip pain.  No relief with conservative measures and pain impacting her mobility, day-to-day activities so opting for surgery.    In regards to medical history:      The patient denies any current or recent dyspnea, cough, sputum, fevers, chills, abdominal pain, nausea, vomiting, diarrhea or other symptoms.  No urinary symptoms, no bright red blood per rectum or melena.    Does report intermittent left-sided chest pain.  Denies any exertional component  Overall her activity levels limited but can go up a flight of stairs at a slow to moderate pace without any exertional chest pain or dyspnea    The patient denies, on specific questioning, the following:  No history of MI, arrhythmia,or CHF.  No history of NESS.  No history of DVT/PE.  No history of COPD.  No history of CVA.  No history of DM.   No history of CKD.     PAST MEDICAL AND SURGICAL HISTORY      Past Medical History:   Diagnosis Date    Anxiety     Arthritis     Depression     Hard of hearing     Hypoglycemia     Lipid disorder     Transfusion history     no reaction       Past Surgical History:   Procedure Laterality Date    CATARACT EXTRACTION W/  INTRAOCULAR LENS IMPLANT Bilateral     COLONOSCOPY      DILATION AND CURETTAGE OF UTERUS      NASAL SEPTUM SURGERY      PELVIC LAPAROSCOPY         MEDICATIONS        Current Outpatient Medications:     escitalopram (LEXAPRO) 5 mg tablet, Take  "5 mg by mouth every morning., Disp: , Rfl:     rosuvastatin (CRESTOR) 10 mg tablet, Take 10 mg by mouth daily., Disp: , Rfl:     ALLERGIES      Latex    FAMILY HISTORY      family history includes Cancer in her biological mother.    Denies any prior known family history of DVTs/PEs/clotting disorder    SOCIAL HISTORY      Social History     Tobacco Use    Smoking status: Former     Types: Cigarettes    Smokeless tobacco: Never   Vaping Use    Vaping Use: Never used   Substance Use Topics    Alcohol use: Not Currently     Comment: wine    Drug use: Never       REVIEW OF SYSTEMS      All other systems reviewed and negative except as noted in HPI    PHYSICAL EXAMINATION      Visit Vitals  BP (!) 158/78 (BP Location: Left upper arm, Patient Position: Sitting)   Pulse 68   Temp 36.4 °C (97.5 °F) (Temporal)   Resp 16   Ht 1.626 m (5' 4\")   Wt 68.1 kg (150 lb 1.6 oz)   SpO2 98%   BMI 25.76 kg/m²     Body mass index is 25.76 kg/m².    Physical Exam  Constitutional:       Appearance: Normal appearance.   HENT:      Head: Normocephalic and atraumatic.   Eyes:      Conjunctiva/sclera: Conjunctivae normal.   Cardiovascular:      Rate and Rhythm: Normal rate and regular rhythm.      Heart sounds: Normal heart sounds.   Pulmonary:      Effort: Pulmonary effort is normal.      Breath sounds: Normal breath sounds.   Abdominal:      General: Bowel sounds are normal. There is no distension.      Palpations: Abdomen is soft.      Tenderness: There is no abdominal tenderness.   Musculoskeletal:      Cervical back: Neck supple.      Comments: Right hip limited ROM from pain.   Skin:     General: Skin is warm and dry.   Neurological:      General: No focal deficit present.      Mental Status: She is alert and oriented to person, place, and time.   Psychiatric:         Behavior: Behavior normal.         LABS / EKG        Labs  reviewed    Lab Results   Component Value Date     09/11/2024    K 3.8 09/11/2024     09/11/2024    " BUN 23 09/11/2024    CREATININE 0.9 09/11/2024    WBC 7.05 09/11/2024    HGB 12.5 09/11/2024    HCT 38.7 09/11/2024     09/11/2024         ECG/Telemetry  ECG personally reviewed: Normal sinus rhythm    ASSESSMENT AND PLAN         Encounter for pre-operative examination  Reports good exercise capacity able to reach greater than 4 mets activity levels.  Denies any prior cardiac history. No hx of TIA/CVA  Reports intermittent left-sided chest pain but denies any exertional component.  Chest pain appears to be atypical for cardiac etiology but will recommend cardiology consult preop for surgical clearance  ECG without signs of ischemia.  Preop labs within acceptable limits.    P:  If patient cleared by cardiology then she may proceed to surgery without additional testing  NSAID use preop per surgery team recommendations    Hyperlipidemia  Continue statin    Depression  Continue Lexapro    Elevated blood pressure reading without diagnosis of hypertension  Mild to moderately elevated this visit. Probably some exacerbation from her right hip pain, anxiety/stress  Instructed patient to monitor blood pressures at home and if persistent elevations noted to reach out to her PCP  Monitor blood pressures postop       In regards to perioperative cardiac risk:  Regarding perioperative cardiovascular risk:  RCRI Score: 0  Risk of major cardiac event (95% CI): Class I Risk. 3.9% (2.8-5.4%) 30-day risk of death, MI, or cardiac arrest        Further comments:  Resume supplements when OK with surgical team.  I would encourage incentive spirometry to assist with minimizing jada-operative pulmonary risk.  DVT prophylaxis and timing of such per the discretion of the surgeon.     Please do not hesitate to contact [facility] during the upcoming hospitalization with any questions or concerns.     Rasheed Yang MD  9/12/2024

## 2024-09-12 LAB
ATRIAL RATE: 65
P AXIS: 0
PR INTERVAL: 152
QRS DURATION: 78
QT INTERVAL: 424
QTC CALCULATION(BAZETT): 440
R AXIS: 44
T WAVE AXIS: 18
VENTRICULAR RATE: 65

## 2024-09-12 PROCEDURE — 93010 ELECTROCARDIOGRAM REPORT: CPT | Performed by: INTERNAL MEDICINE

## 2024-09-12 NOTE — DISCHARGE INSTRUCTIONS
Please keep a blood pressure log at home and follow up with your primary care physician to discuss blood pressures.     Please see Dr. Danielson's printed instructions for further information on your recovery.     Take Keflex 500mg three times a day for 2 weeks for infection prophylaxis.     DVT Prophylaxis:  -Continue with Aspirin 81mg by mouth twice daily X 4 weeks for blood clot prevention.    Constipation/ Pain Med:   - Take your pain medication with food to prevent nausea  - Do not drive while taking pain medications.   - Pain medications may cause constipation.   - Drink plenty of fluids and eat fresh fruits and vegetables.  - Please take Senna-Colace as prescribed. Hold for loose stool. If you are having difficulties having a bowel movement or haven't had bowel movement in 2 days, please add over the counter miralax and take as directed on package.   - If you have not had a bowel movement in three days please call the office.    Incision Care:   - On September 24, you may take off your dressing and leave your incision open to air.   - However, if there is any drainage please keep covered with gauze and tape.  - Please keep your incision(s) clean and dry  - Make sure your incision(s) are checked at least twice daily for signs and symptoms of infection.   If any of the below should occur, please call the office:  - Drainage from incision site  - Opening of incisions  - Fevers greater than 101  - Flu-like symptoms  - Increased redness and/or tenderness  Please call office or go to emergency department if :  - Thigh/calf pain or swelling in legs  - Chest pain  - Chest congestion  - Problems with breathing.

## 2024-09-16 ENCOUNTER — ANESTHESIA EVENT (OUTPATIENT)
Dept: OPERATING ROOM | Facility: HOSPITAL | Age: 74
Setting detail: HOSPITAL OUTPATIENT SURGERY
End: 2024-09-16
Payer: MEDICARE

## 2024-09-16 ASSESSMENT — LIFESTYLE VARIABLES: TOBACCO_USE: 1

## 2024-09-16 ASSESSMENT — ENCOUNTER SYMPTOMS: DEPRESSION: 1

## 2024-09-16 NOTE — ANESTHESIA PREPROCEDURE EVALUATION
Relevant Problems   NEUROLOGY   (+) Depression     73 y/o F h/o elevated BP without dx of HTN, HLD, anxiety, depression, here for R CURRY.    No anticoagulation meds, Plt 187    Reports intermittent L sided CP for past 5 years- not a/w particular pattern or exertion (saw cards 5 yrs ago and had prior normal stress test), per cards clearance patient describes symptoms of atypical chest pain that are not exertional and has good functional tolerance.      Current Outpatient Medications:     escitalopram (LEXAPRO) 5 mg tablet, Take 5 mg by mouth every morning., Disp: , Rfl:     rosuvastatin (CRESTOR) 10 mg tablet, Take 10 mg by mouth daily., Disp: , Rfl:     Anesthesia ROS/MED HX    Anesthesia History    Previous anesthetics  No family history of anesthetic complications  No history of anesthetic complications  Pulmonary    history of tobacco use and ex-smoker  Neuro/Psych    Depression   Anxiety  Cardiovascular   dyslipidemia  Hematological - neg  GI/Hepatic- neg  Musculoskeletal   Osteoarthritis  Renal Disease- neg  Endo/Other- neg  Body Habitus: Overweight       Past Surgical History   Procedure Laterality Date    Cataract extraction w/  intraocular lens implant Bilateral     Colonoscopy      Dilation and curettage of uterus      Nasal septum surgery      Pelvic laparoscopy         Physical Exam    Airway   Mallampati: II   TM distance: >3 FB   Neck ROM: full  Cardiovascular - normal   Rhythm: regular   Rate: normalPulmonary - normal   clear to auscultation  Dental    Teeth Problems: caps and implants    Anesthesia  Exam Comments:   Exam Airway: Chin slightly retrognathic        9/10/2024 ECG  NSR, nl ECG    LABS:    CBC Results         09/11/24     1429    WBC 7.05    RBC 4.29    HGB 12.5    HCT 38.7    MCV 90.2    MCH 29.1    MCHC 32.3              BMP Results         09/11/24     1429        K 3.8    Cl 107    CO2 27    Glucose 91    BUN 23    Creatinine 0.9    Calcium 9.0    Anion Gap 5    EGFR >60.0            Comment for EGFR at 1429 on 09/11/24: Calculation based on the Chronic Kidney Disease Epidemiology Collaboration (CKD-EPI) equation refit without adjustment for race.          PT/PTT Results    No lab values to display.               Lab Results   Component Value Date    LABANTI Negative 09/11/2024    ABO B 09/11/2024    LABRH Negative 09/11/2024    HISTCK No type on file 09/11/2024             Anesthesia Plan    Plan: spinal    Technique: spinal     Lines and Monitors: PIV     Airway: natural airway / supplemental oxygen    2 ASA  Anesthetic plan and risks discussed with: patient  Postop Plan:   Pain Management: IV analgesics

## 2024-09-17 ENCOUNTER — ANESTHESIA (OUTPATIENT)
Dept: OPERATING ROOM | Facility: HOSPITAL | Age: 74
Setting detail: HOSPITAL OUTPATIENT SURGERY
End: 2024-09-17
Payer: MEDICARE

## 2024-09-17 ENCOUNTER — HOSPITAL ENCOUNTER (OUTPATIENT)
Facility: HOSPITAL | Age: 74
Discharge: HOME | End: 2024-09-18
Attending: ORTHOPAEDIC SURGERY | Admitting: ORTHOPAEDIC SURGERY
Payer: MEDICARE

## 2024-09-17 ENCOUNTER — APPOINTMENT (OUTPATIENT)
Dept: RADIOLOGY | Facility: HOSPITAL | Age: 74
End: 2024-09-17
Attending: NURSE PRACTITIONER
Payer: MEDICARE

## 2024-09-17 DIAGNOSIS — Z98.890 STATUS POST HIP SURGERY: Primary | ICD-10-CM

## 2024-09-17 LAB
ABO + RH BLD: NORMAL
D AG BLD QL: NEGATIVE

## 2024-09-17 PROCEDURE — 36415 COLL VENOUS BLD VENIPUNCTURE: CPT | Performed by: ORTHOPAEDIC SURGERY

## 2024-09-17 PROCEDURE — 63700000 HC SELF-ADMINISTRABLE DRUG: Performed by: NURSE PRACTITIONER

## 2024-09-17 PROCEDURE — 25800000 HC PHARMACY IV SOLUTIONS: Performed by: NURSE PRACTITIONER

## 2024-09-17 PROCEDURE — 63600000 HC DRUGS/DETAIL CODE: Mod: JZ | Performed by: NURSE PRACTITIONER

## 2024-09-17 PROCEDURE — 63600000 HC DRUGS/DETAIL CODE: Mod: JZ

## 2024-09-17 PROCEDURE — 0SR90JZ REPLACEMENT OF RIGHT HIP JOINT WITH SYNTHETIC SUBSTITUTE, OPEN APPROACH: ICD-10-PCS | Performed by: ORTHOPAEDIC SURGERY

## 2024-09-17 PROCEDURE — 25000000 HC PHARMACY GENERAL: Performed by: ORTHOPAEDIC SURGERY

## 2024-09-17 PROCEDURE — 99232 SBSQ HOSP IP/OBS MODERATE 35: CPT | Performed by: HOSPITALIST

## 2024-09-17 PROCEDURE — 36000015 HC OR LEVEL 5 EA ADDL MIN: Performed by: ORTHOPAEDIC SURGERY

## 2024-09-17 PROCEDURE — 72170 X-RAY EXAM OF PELVIS: CPT

## 2024-09-17 PROCEDURE — 97116 GAIT TRAINING THERAPY: CPT | Mod: GP | Performed by: PHYSICAL THERAPIST

## 2024-09-17 PROCEDURE — 63700000 HC SELF-ADMINISTRABLE DRUG

## 2024-09-17 PROCEDURE — 97166 OT EVAL MOD COMPLEX 45 MIN: CPT | Mod: GO

## 2024-09-17 PROCEDURE — 25800000 HC PHARMACY IV SOLUTIONS

## 2024-09-17 PROCEDURE — 63600000 HC DRUGS/DETAIL CODE: Mod: JZ | Performed by: ORTHOPAEDIC SURGERY

## 2024-09-17 PROCEDURE — 25000000 HC PHARMACY GENERAL: Performed by: ANESTHESIOLOGY

## 2024-09-17 PROCEDURE — 63700000 HC SELF-ADMINISTRABLE DRUG: Performed by: ORTHOPAEDIC SURGERY

## 2024-09-17 PROCEDURE — 27200000 HC STERILE SUPPLY: Performed by: ORTHOPAEDIC SURGERY

## 2024-09-17 PROCEDURE — 25800000 HC PHARMACY IV SOLUTIONS: Performed by: ANESTHESIOLOGY

## 2024-09-17 PROCEDURE — 71000011 HC PACU PHASE 1 EA ADDL MIN: Performed by: ORTHOPAEDIC SURGERY

## 2024-09-17 PROCEDURE — 63600000 HC DRUGS/DETAIL CODE: Mod: JG | Performed by: ANESTHESIOLOGY

## 2024-09-17 PROCEDURE — 36000005 HC OR LEVEL 5 INITIAL 30MIN: Performed by: ORTHOPAEDIC SURGERY

## 2024-09-17 PROCEDURE — 37000010 HC ANESTHESIA SPINAL: Performed by: ORTHOPAEDIC SURGERY

## 2024-09-17 PROCEDURE — 97162 PT EVAL MOD COMPLEX 30 MIN: CPT | Mod: GP | Performed by: PHYSICAL THERAPIST

## 2024-09-17 PROCEDURE — C1713 ANCHOR/SCREW BN/BN,TIS/BN: HCPCS | Performed by: ORTHOPAEDIC SURGERY

## 2024-09-17 PROCEDURE — C1776 JOINT DEVICE (IMPLANTABLE): HCPCS | Performed by: ORTHOPAEDIC SURGERY

## 2024-09-17 PROCEDURE — 71000001 HC PACU PHASE 1 INITIAL 30MIN: Performed by: ORTHOPAEDIC SURGERY

## 2024-09-17 DEVICE — EMPHASYS POLYETHYLENE LINER AOX NEUTRAL 54MM 40MM
Type: IMPLANTABLE DEVICE | Site: HIP | Status: FUNCTIONAL
Brand: EMPHASYS

## 2024-09-17 DEVICE — BIOLOX DELTA TS CERAMIC FEMORAL HEAD 12/14 TAPER REVISION DIAMETER 40MM +1.5
Type: IMPLANTABLE DEVICE | Site: HIP | Status: FUNCTIONAL
Brand: BIOLOX DELTA

## 2024-09-17 DEVICE — ACTIS DUOFIX HIP PROSTHESIS (FEMORAL STEM 12/14 TAPER CEMENTLESS SIZE 6 HIGH COLLAR)  CE
Type: IMPLANTABLE DEVICE | Site: HIP | Status: FUNCTIONAL
Brand: ACTIS

## 2024-09-17 DEVICE — PINNACLE CANCELLOUS BONE SCREW 6.5MM X 15MM
Type: IMPLANTABLE DEVICE | Site: HIP | Status: FUNCTIONAL
Brand: PINNACLE

## 2024-09-17 DEVICE — PINNACLE CANCELLOUS BONE SCREW 6.5MM X 35MM
Type: IMPLANTABLE DEVICE | Site: HIP | Status: FUNCTIONAL
Brand: PINNACLE

## 2024-09-17 DEVICE — EMPHASYS ACETABULAR SHELL THREE-HOLE 54MM CEMENTLESS
Type: IMPLANTABLE DEVICE | Site: HIP | Status: FUNCTIONAL
Brand: EMPHASYS

## 2024-09-17 RX ORDER — DEXAMETHASONE SODIUM PHOSPHATE 4 MG/ML
8 INJECTION, SOLUTION INTRA-ARTICULAR; INTRALESIONAL; INTRAMUSCULAR; INTRAVENOUS; SOFT TISSUE ONCE
Status: COMPLETED | OUTPATIENT
Start: 2024-09-18 | End: 2024-09-18

## 2024-09-17 RX ORDER — ONDANSETRON HYDROCHLORIDE 2 MG/ML
INJECTION, SOLUTION INTRAVENOUS AS NEEDED
Status: DISCONTINUED | OUTPATIENT
Start: 2024-09-17 | End: 2024-09-17 | Stop reason: SURG

## 2024-09-17 RX ORDER — ROSUVASTATIN CALCIUM 10 MG/1
10 TABLET, COATED ORAL
Status: DISCONTINUED | OUTPATIENT
Start: 2024-09-17 | End: 2024-09-18 | Stop reason: HOSPADM

## 2024-09-17 RX ORDER — SODIUM CHLORIDE 0.9 G/100ML
INJECTION, SOLUTION IRRIGATION
Status: DISCONTINUED | OUTPATIENT
Start: 2024-09-17 | End: 2024-09-17 | Stop reason: HOSPADM

## 2024-09-17 RX ORDER — ALUMINUM HYDROXIDE, MAGNESIUM HYDROXIDE, AND SIMETHICONE 1200; 120; 1200 MG/30ML; MG/30ML; MG/30ML
30 SUSPENSION ORAL EVERY 4 HOURS PRN
Status: DISCONTINUED | OUTPATIENT
Start: 2024-09-17 | End: 2024-09-18 | Stop reason: HOSPADM

## 2024-09-17 RX ORDER — DEXTROSE 50 % IN WATER (D50W) INTRAVENOUS SYRINGE
25 AS NEEDED
Status: DISCONTINUED | OUTPATIENT
Start: 2024-09-17 | End: 2024-09-18 | Stop reason: HOSPADM

## 2024-09-17 RX ORDER — KETOROLAC TROMETHAMINE 15 MG/ML
15 INJECTION, SOLUTION INTRAMUSCULAR; INTRAVENOUS
Status: DISCONTINUED | OUTPATIENT
Start: 2024-09-17 | End: 2024-09-18 | Stop reason: HOSPADM

## 2024-09-17 RX ORDER — CEPHALEXIN 500 MG/1
500 CAPSULE ORAL 3 TIMES DAILY
Status: DISCONTINUED | OUTPATIENT
Start: 2024-09-18 | End: 2024-09-18 | Stop reason: HOSPADM

## 2024-09-17 RX ORDER — TRANEXAMIC ACID 10 MG/ML
1000 INJECTION, SOLUTION INTRAVENOUS ONCE
Status: COMPLETED | OUTPATIENT
Start: 2024-09-17 | End: 2024-09-17

## 2024-09-17 RX ORDER — ACETAMINOPHEN 325 MG/1
975 TABLET ORAL
Status: COMPLETED | OUTPATIENT
Start: 2024-09-17 | End: 2024-09-17

## 2024-09-17 RX ORDER — FENTANYL CITRATE 50 UG/ML
INJECTION, SOLUTION INTRAMUSCULAR; INTRAVENOUS AS NEEDED
Status: DISCONTINUED | OUTPATIENT
Start: 2024-09-17 | End: 2024-09-17 | Stop reason: SURG

## 2024-09-17 RX ORDER — ESCITALOPRAM OXALATE 5 MG/1
5 TABLET ORAL EVERY MORNING
Status: DISCONTINUED | OUTPATIENT
Start: 2024-09-18 | End: 2024-09-18 | Stop reason: HOSPADM

## 2024-09-17 RX ORDER — DIPHENHYDRAMINE HCL 50 MG/ML
25 VIAL (ML) INJECTION EVERY 6 HOURS PRN
Status: DISCONTINUED | OUTPATIENT
Start: 2024-09-17 | End: 2024-09-18 | Stop reason: HOSPADM

## 2024-09-17 RX ORDER — DEXTROSE 40 %
15-30 GEL (GRAM) ORAL AS NEEDED
Status: DISCONTINUED | OUTPATIENT
Start: 2024-09-17 | End: 2024-09-18 | Stop reason: HOSPADM

## 2024-09-17 RX ORDER — SODIUM CHLORIDE 9 MG/ML
INJECTION INTRAMUSCULAR; INTRAVENOUS; SUBCUTANEOUS
Status: DISCONTINUED | OUTPATIENT
Start: 2024-09-17 | End: 2024-09-17 | Stop reason: HOSPADM

## 2024-09-17 RX ORDER — ACETAMINOPHEN 325 MG/1
650 TABLET ORAL
Status: DISCONTINUED | OUTPATIENT
Start: 2024-09-17 | End: 2024-09-18 | Stop reason: HOSPADM

## 2024-09-17 RX ORDER — POLYETHYLENE GLYCOL 3350 17 G/17G
17 POWDER, FOR SOLUTION ORAL DAILY
Status: DISCONTINUED | OUTPATIENT
Start: 2024-09-17 | End: 2024-09-18 | Stop reason: HOSPADM

## 2024-09-17 RX ORDER — EPHEDRINE SULFATE 50 MG/ML
INJECTION, SOLUTION INTRAVENOUS AS NEEDED
Status: DISCONTINUED | OUTPATIENT
Start: 2024-09-17 | End: 2024-09-17 | Stop reason: SURG

## 2024-09-17 RX ORDER — BUPIVACAINE HYDROCHLORIDE 7.5 MG/ML
INJECTION, SOLUTION INTRASPINAL AS NEEDED
Status: DISCONTINUED | OUTPATIENT
Start: 2024-09-17 | End: 2024-09-17 | Stop reason: SURG

## 2024-09-17 RX ORDER — MIDAZOLAM HYDROCHLORIDE 2 MG/2ML
INJECTION, SOLUTION INTRAMUSCULAR; INTRAVENOUS AS NEEDED
Status: DISCONTINUED | OUTPATIENT
Start: 2024-09-17 | End: 2024-09-17 | Stop reason: SURG

## 2024-09-17 RX ORDER — NAPROXEN SODIUM 220 MG/1
81 TABLET, FILM COATED ORAL 2 TIMES DAILY
Status: DISCONTINUED | OUTPATIENT
Start: 2024-09-17 | End: 2024-09-18 | Stop reason: HOSPADM

## 2024-09-17 RX ORDER — CELECOXIB 200 MG/1
CAPSULE ORAL
Status: COMPLETED
Start: 2024-09-17 | End: 2024-09-17

## 2024-09-17 RX ORDER — SODIUM CHLORIDE 9 MG/ML
INJECTION, SOLUTION INTRAVENOUS CONTINUOUS PRN
Status: DISCONTINUED | OUTPATIENT
Start: 2024-09-17 | End: 2024-09-17 | Stop reason: SURG

## 2024-09-17 RX ORDER — CELECOXIB 200 MG/1
400 CAPSULE ORAL
Status: COMPLETED | OUTPATIENT
Start: 2024-09-17 | End: 2024-09-17

## 2024-09-17 RX ORDER — DIPHENHYDRAMINE HCL 25 MG
25 CAPSULE ORAL EVERY 6 HOURS PRN
Status: DISCONTINUED | OUTPATIENT
Start: 2024-09-17 | End: 2024-09-18 | Stop reason: HOSPADM

## 2024-09-17 RX ORDER — AMOXICILLIN 250 MG
1 CAPSULE ORAL 2 TIMES DAILY
Status: DISCONTINUED | OUTPATIENT
Start: 2024-09-17 | End: 2024-09-18 | Stop reason: HOSPADM

## 2024-09-17 RX ORDER — IBUPROFEN 200 MG
16-32 TABLET ORAL AS NEEDED
Status: DISCONTINUED | OUTPATIENT
Start: 2024-09-17 | End: 2024-09-18 | Stop reason: HOSPADM

## 2024-09-17 RX ORDER — ACETAMINOPHEN 325 MG/1
TABLET ORAL
Status: COMPLETED
Start: 2024-09-17 | End: 2024-09-17

## 2024-09-17 RX ORDER — ONDANSETRON 4 MG/1
4 TABLET, ORALLY DISINTEGRATING ORAL EVERY 8 HOURS PRN
Status: DISCONTINUED | OUTPATIENT
Start: 2024-09-17 | End: 2024-09-18 | Stop reason: HOSPADM

## 2024-09-17 RX ORDER — BISACODYL 10 MG/1
10 SUPPOSITORY RECTAL DAILY PRN
Status: DISCONTINUED | OUTPATIENT
Start: 2024-09-17 | End: 2024-09-18 | Stop reason: HOSPADM

## 2024-09-17 RX ORDER — ONDANSETRON HYDROCHLORIDE 2 MG/ML
4 INJECTION, SOLUTION INTRAVENOUS EVERY 8 HOURS PRN
Status: DISCONTINUED | OUTPATIENT
Start: 2024-09-17 | End: 2024-09-18 | Stop reason: HOSPADM

## 2024-09-17 RX ORDER — OXYCODONE HYDROCHLORIDE 5 MG/1
5-10 TABLET ORAL EVERY 4 HOURS PRN
Status: DISCONTINUED | OUTPATIENT
Start: 2024-09-17 | End: 2024-09-18 | Stop reason: HOSPADM

## 2024-09-17 RX ORDER — SODIUM CHLORIDE 9 MG/ML
INJECTION, SOLUTION INTRAVENOUS CONTINUOUS
Status: ACTIVE | OUTPATIENT
Start: 2024-09-17 | End: 2024-09-18

## 2024-09-17 RX ORDER — PREGABALIN 75 MG/1
75 CAPSULE ORAL
Status: DISCONTINUED | OUTPATIENT
Start: 2024-09-17 | End: 2024-09-18 | Stop reason: HOSPADM

## 2024-09-17 RX ORDER — ROPIVACAINE HYDROCHLORIDE 5 MG/ML
INJECTION, SOLUTION EPIDURAL; INFILTRATION; PERINEURAL
Status: DISCONTINUED | OUTPATIENT
Start: 2024-09-17 | End: 2024-09-17 | Stop reason: HOSPADM

## 2024-09-17 RX ORDER — PROPOFOL 10 MG/ML
INJECTION, EMULSION INTRAVENOUS CONTINUOUS PRN
Status: DISCONTINUED | OUTPATIENT
Start: 2024-09-17 | End: 2024-09-17 | Stop reason: SURG

## 2024-09-17 RX ADMIN — CEFAZOLIN 2 G: 2 INJECTION, POWDER, FOR SOLUTION INTRAMUSCULAR; INTRAVENOUS at 22:51

## 2024-09-17 RX ADMIN — CELECOXIB 400 MG: 200 CAPSULE ORAL at 06:18

## 2024-09-17 RX ADMIN — TRANEXAMIC ACID 1000 MG: 10 INJECTION, SOLUTION INTRAVENOUS at 06:46

## 2024-09-17 RX ADMIN — SODIUM CHLORIDE: 9 INJECTION, SOLUTION INTRAVENOUS at 11:42

## 2024-09-17 RX ADMIN — EPHEDRINE SULFATE 5 MG: 50 INJECTION, SOLUTION INTRAVENOUS at 07:04

## 2024-09-17 RX ADMIN — DOCUSATE SODIUM AND SENNOSIDES 1 TABLET: 8.6; 5 TABLET, FILM COATED ORAL at 20:21

## 2024-09-17 RX ADMIN — VANCOMYCIN HYDROCHLORIDE 1000 MG: 1 INJECTION, POWDER, LYOPHILIZED, FOR SOLUTION INTRAVENOUS at 06:16

## 2024-09-17 RX ADMIN — ACETAMINOPHEN 975 MG: 325 TABLET ORAL at 06:19

## 2024-09-17 RX ADMIN — BUPIVACAINE HYDROCHLORIDE IN DEXTROSE 1.8 ML: 7.5 INJECTION, SOLUTION SUBARACHNOID at 06:36

## 2024-09-17 RX ADMIN — KETOROLAC TROMETHAMINE 15 MG: 15 INJECTION, SOLUTION INTRAMUSCULAR; INTRAVENOUS at 11:43

## 2024-09-17 RX ADMIN — PROPOFOL 80 MCG/KG/MIN: 10 INJECTION, EMULSION INTRAVENOUS at 06:41

## 2024-09-17 RX ADMIN — SODIUM CHLORIDE: 0.9 INJECTION, SOLUTION INTRAVENOUS at 06:29

## 2024-09-17 RX ADMIN — EPHEDRINE SULFATE 5 MG: 50 INJECTION, SOLUTION INTRAVENOUS at 07:38

## 2024-09-17 RX ADMIN — KETOROLAC TROMETHAMINE 15 MG: 15 INJECTION, SOLUTION INTRAMUSCULAR; INTRAVENOUS at 17:48

## 2024-09-17 RX ADMIN — ACETAMINOPHEN 650 MG: 325 TABLET ORAL at 23:42

## 2024-09-17 RX ADMIN — ASPIRIN 81 MG CHEWABLE TABLET 81 MG: 81 TABLET CHEWABLE at 20:21

## 2024-09-17 RX ADMIN — ACETAMINOPHEN 650 MG: 325 TABLET ORAL at 17:48

## 2024-09-17 RX ADMIN — EPHEDRINE SULFATE 5 MG: 50 INJECTION, SOLUTION INTRAVENOUS at 07:16

## 2024-09-17 RX ADMIN — ROSUVASTATIN CALCIUM 10 MG: 10 TABLET, FILM COATED ORAL at 20:29

## 2024-09-17 RX ADMIN — FENTANYL CITRATE 50 MCG: 50 INJECTION INTRAMUSCULAR; INTRAVENOUS at 06:30

## 2024-09-17 RX ADMIN — OXYCODONE HYDROCHLORIDE 5 MG: 5 TABLET ORAL at 23:42

## 2024-09-17 RX ADMIN — MIDAZOLAM HYDROCHLORIDE 1 MG: 1 INJECTION, SOLUTION INTRAMUSCULAR; INTRAVENOUS at 06:30

## 2024-09-17 RX ADMIN — SODIUM CHLORIDE: 9 INJECTION, SOLUTION INTRAVENOUS at 20:30

## 2024-09-17 RX ADMIN — ACETAMINOPHEN 650 MG: 325 TABLET ORAL at 11:43

## 2024-09-17 RX ADMIN — MIDAZOLAM HYDROCHLORIDE 1 MG: 1 INJECTION, SOLUTION INTRAMUSCULAR; INTRAVENOUS at 06:26

## 2024-09-17 RX ADMIN — ONDANSETRON 4 MG: 2 INJECTION INTRAMUSCULAR; INTRAVENOUS at 06:26

## 2024-09-17 RX ADMIN — CEFAZOLIN 2 G: 2 INJECTION, POWDER, FOR SOLUTION INTRAMUSCULAR; INTRAVENOUS at 15:14

## 2024-09-17 RX ADMIN — POLYETHYLENE GLYCOL 3350 17 G: 17 POWDER, FOR SOLUTION ORAL at 11:42

## 2024-09-17 RX ADMIN — DOCUSATE SODIUM AND SENNOSIDES 1 TABLET: 8.6; 5 TABLET, FILM COATED ORAL at 11:43

## 2024-09-17 RX ADMIN — KETOROLAC TROMETHAMINE 15 MG: 15 INJECTION, SOLUTION INTRAMUSCULAR; INTRAVENOUS at 23:43

## 2024-09-17 RX ADMIN — CEFAZOLIN 2 G: 2 INJECTION, POWDER, FOR SOLUTION INTRAMUSCULAR; INTRAVENOUS at 06:43

## 2024-09-17 ASSESSMENT — COGNITIVE AND FUNCTIONAL STATUS - GENERAL
MOVING TO AND FROM BED TO CHAIR: 3 - A LITTLE
HELP NEEDED FOR PERSONAL GROOMING: 3 - A LITTLE
WALKING IN HOSPITAL ROOM: 3 - A LITTLE
CLIMB 3 TO 5 STEPS WITH RAILING: 3 - A LITTLE
MOVING TO AND FROM BED TO CHAIR: 3 - A LITTLE
STANDING UP FROM CHAIR USING ARMS: 3 - A LITTLE
STANDING UP FROM CHAIR USING ARMS: 3 - A LITTLE
WALKING IN HOSPITAL ROOM: 3 - A LITTLE
AFFECT: WFL
TOILETING: 3 - A LITTLE
STANDING UP FROM CHAIR USING ARMS: 3 - A LITTLE
CLIMB 3 TO 5 STEPS WITH RAILING: 3 - A LITTLE
DRESSING REGULAR LOWER BODY CLOTHING: 2 - A LOT
WALKING IN HOSPITAL ROOM: 3 - A LITTLE
DRESSING REGULAR UPPER BODY CLOTHING: 3 - A LITTLE
MOVING TO AND FROM BED TO CHAIR: 3 - A LITTLE
EATING MEALS: 4 - NONE
AFFECT: WNL
HELP NEEDED FOR BATHING: 3 - A LITTLE
CLIMB 3 TO 5 STEPS WITH RAILING: 3 - A LITTLE

## 2024-09-17 NOTE — OR SURGEON
Pre-Procedure patient identification:  I am the primary operating surgeon/proceduralist and I have reviewed the applicable pathology reports and radiology studies for this procedure. I have identified the patient and confirmed laterality is right on 09/17/24 at 6:27 AM Jocelyn Danielson MD  Phone Number: [phone]

## 2024-09-17 NOTE — HOSPITAL COURSE
Shahnaz is a 74 y.o. female admitted on 9/17/2024 with Osteoarthritis of left hip, unspecified osteoarthritis type [M16.12]  S/P hip replacement, right [Z96.641]. Principal problem is No Principal Problem: There is no principal problem currently on the Problem List. Please update the Problem List and refresh..    Past Medical History  Shahnaz has a past medical history of Anxiety, Arthritis, Depression, Hard of hearing, Hypoglycemia, Lipid disorder, and Transfusion history.    History of Present Illness   73 y/o female s/p right total hip arthroplasty - WBAT , no hip precautions

## 2024-09-17 NOTE — PROGRESS NOTES
Occupational Therapy -  Initial Evaluation     Patient: Shahnaz Tejada  Location: [facility] 5PAV [id number]  MRN: [id number]  Today's date: 9/17/2024    HISTORY OF PRESENT ILLNESS     Shahnaz is a 74 y.o. female admitted on 9/17/2024 with Osteoarthritis of left hip, unspecified osteoarthritis type [M16.12]  S/P hip replacement, right [Z96.641]. Principal problem is No Principal Problem: There is no principal problem currently on the Problem List. Please update the Problem List and refresh..    Past Medical History  Shahnaz has a past medical history of Anxiety, Arthritis, Depression, Hard of hearing, Hypoglycemia, Lipid disorder, and Transfusion history.    History of Present Illness   75 y/o female s/p right total hip arthroplasty - WBAT , no hip precautions    PRIOR LEVEL OF FUNCTION AND LIVING ENVIRONMENT     Prior Level of Function      Flowsheet Row Most Recent Value   Dominant Hand right   Ambulation independent   Transferring independent   Toileting independent   Bathing independent   Dressing independent   Eating independent   IADLs independent   Driving/Transportation    Communication understands/communicates without difficulty   Prior Level of Function Comment IND w/ all ADLs and mobility w/o AD.             Prior Living Environment      Flowsheet Row Most Recent Value   People in Home spouse   Current Living Arrangements home   Living Environment Comment lives with her  in [address] with 5 [address], stall with lip to enter          Occupational Profile      Flowsheet Row Most Recent Value   Successful Occupations is active, does aerobic exercise videos   Occupational History/Life Experiences retired teacher, enjoys gardening   Environmental Supports and Barriers From home with spouse          VITALS AND PAIN     OT Vitals      Date/Time Pulse SpO2 Pt Activity O2 Therapy BP BP Location BP Method Pt Position Observations Who   09/17/24 1517 75 96 % At rest None (Room air) 158/79 Right  upper arm Automatic Sitting -- VV   09/17/24 1529 75 95 % At rest None (Room air) 129/69 Left upper arm Automatic Sitting NAD, asymptomatic, may be discrepency between arms VV          OT Pain      Date/Time Pain Type Side/Orientation Location Rating: Rest Rating: Activity Interventions [facility]   09/17/24 1517 Pain Assessment;Pain Reassessment hip right 3 2 care clustered VV             Objective   OBJECTIVE     Start time:  1515  End time:  1529  Session Length: 14 min  Mode of Treatment: co-treatment  Reason for co-treatment: w/ PT (DF) for expedited dispo planning, POD#0    General Observations  Patient received upright, in chair. She was no issues or concerns identified by nurse prior to session, agreeable to therapy.  present t/o session.    Precautions: fall, hip, weight bearing  Hip Precautions: no precautions needed  Extremity Weight-Bearing Status: right lower extremity  Right LE Weight-Bearing Status: weight-bearing as tolerated (WBAT)    Limitations/Impairments: hearing (BL hearing aids, required incr volume and slower cadance)      OT Eval and Treat - 09/17/24 1515          Cognition    Orientation Status oriented x 4     Affect/Mental Status WFL     Follows Commands WFL     Cognitive Function WFL     Comment, Cognition very pleasant and cooperative t/o session        Vision Assessment/Intervention    Vision Assessment corrective lenses full-time        Hearing Assessment    Hearing Status hearing aid, bilateral;hearing impairment, bilaterally     Impact of Hearing Impairment on Functional Status very [place] and incr volume and slower cadance required        Sensory Assessment    Sensory Assessment sensation intact, upper extremities        Upper Extremity Assessment    UE Assessment ROM and Strength WFL     General Observations 5/5 , AROM WFL        Bed Mobility    Comment OOB on arrival        Mobility Belt    Mobility Belt Used During Session yes        Sit/Stand Transfer    Surface chair with arm  rests     [place] supervision     Safety/Cues verbal cues;hand placement;technique     Assistive Device walker, front-wheeled     Transfer Comments from/to recliner. Good steady push to stand without LOB. Cues for safe hand placement and tech for safe return to recliner.        Toilet Transfer    Comment offered/ declined        Functional Mobility    Distance hallway     Functional Mobility [place] close supervision     Safety/Cues increased time to complete;verbal cues;proper use of assistive device;technique     Assistive Device walker, front-wheeled     Functional Mobility Comments Short distance to hallway and back (20ft x2). no overt LOB noted. edu on proper RW management and sequencing. Denied LH/dizziness while OOB. reports improved pain with activity        Lower Body Dressing    Tasks socks     [place] dependent (less than 25% patient effort)     Position supported sitting     Comment 2* dcr func reach and LE mobility post op. Edu on LH AE avail for incr IND and safety w/ LE ADLs.        Toileting    Comment offered/declined        Balance    Static Sitting Balance WFL     Dynamic Sitting Balance WFL     Sit to Stand Dynamic Balance WFL;supported     Static Standing Balance WFL;supported     Dynamic Standing Balance mild impairment;supported     Comment, Balance CLS with RW. no overt LOB noted        Impairments/Safety Issues    Impairments Affecting Function balance;endurance/activity tolerance;range of motion (ROM);strength;pain        Lower Extremity (Therapeutic Exercise)    Exercise Position/Type AROM (active range of motion)     General Exercise bilateral;ankle pumps;heel slides;gluteal sets     Comment Edu and rec to complete LE [place]ina while in recliner to encourage incr blood circulation, edema management, and blood clot prevention                                    Education Documentation  Activity, taught by Zoila Davis OT at 9/17/2024  4:13 PM.  Learner: Significant Other,  Patient  Readiness: Acceptance  Method: Explanation  Response: Verbalizes Understanding  Comment: OT POC/role, safety and tech w/ func transfers and mobility w/ RW, safety w/ ADLs, econ/pacing, ortho class, no hip precautions, WBS, and d/c planning        Session Outcome  Patient reclined, in chair, leg(s) elevated at end of session, chair alarm on, all needs met, call light in reach, personal items in reach. Nursing notified about patient's performance, patient's response to therapy/activity, and patient's position.    AM-PAC™ - ADL (Current Function)     Putting on/taking off regular lower body clothing 2 - A Lot   Bathing 3 - A Little   Toileting 3 - A Little   Putting on/taking off regular upper body clothing 3 - A Little   Help for taking care of personal grooming 3 - A Little   Eating meals 4 - None   AM-PAC™ ADL Score 18      ASSESSMENT AND PLAN     Progress Summary  OT eval completed. Pt is a 74 y.o. female s/p R CURRY. Pt requried SUP for STS, CLS for short distance func mobility w/ RW, and DEP for LBD 2* dcr func reach and LE mobility post-op. Edu on LH AE avail to incr IND /safety w/ LE ADLs. As well as WBS, no hip precautions, LE Corina, and ortho class. Limited by balance, pain, activity tolerance, and RLE ROM/strength. Pt and spouse receptive to all edu and recs. OT to cont w/ POC in acute setting. Rec d/c home w/ (A) pending ortho class progress.    Patient/Family Therapy Goal Statement: To return home at d/c    OT Plan      Flowsheet Row Most Recent Value   Rehab Potential good, to achieve stated therapy goals at 09/17/2024 1515   Therapy Frequency daily at 09/17/2024 1515   Planned Therapy Interventions activity tolerance training, adaptive equipment training, BADL retraining, functional balance retraining, occupation/activity based interventions, patient/caregiver education/training, transfer/mobility retraining at 09/17/2024 1515            OT Discharge Recommendations      Flowsheet Row Most Recent  Value   OT Recommended Discharge Disposition home with assistance at 09/17/2024 1515   Anticipated Equipment Needs if Discharged Home (OT) walker, front-wheeled, dressing equipment, shower chair, commode, 3-in-1 at 09/17/2024 1515                 OT Goals      Flowsheet Row Most Recent Value   Bed Mobility Goal 1    Activity/Assistive Device bed mobility activities, all at 09/17/2024 1515   [place] modified independence at 09/17/2024 1515   Time Frame 3-5 days at 09/17/2024 1515   Progress/Outcome goal ongoing at 09/17/2024 1515   Transfer Goal 1    Activity/Assistive Device sit-to-stand/stand-to-sit, toilet at 09/17/2024 1515   [place] modified independence at 09/17/2024 1515   Time Frame 3-5 days at 09/17/2024 1515   Progress/Outcome goal ongoing at 09/17/2024 1515   Transfer Goal 2    Activity/Assistive Device shower, tub at 09/17/2024 1515   [place] supervision required at 09/17/2024 1515   Time Frame 3-5 days at 09/17/2024 1515   Progress/Outcome goal ongoing at 09/17/2024 1515   Dressing Goal 1    Activity/Adaptive Equipment lower body dressing at 09/17/2024 1515   [place] modified independence at 09/17/2024 1515   Time Frame 3-5 days at 09/17/2024 1515   Strategies/Barriers Edu on LH AE and adaptive dressing strategies. at 09/17/2024 1515   Progress/Outcome goal ongoing at 09/17/2024 1515

## 2024-09-17 NOTE — ASSESSMENT & PLAN NOTE
S/p Right Total Hip Arthroplasty with Dr. Danielson on 09/17/24   - Management as per Ortho  - Pain control, DVT ppx, WBS, PT/OT as per Ortho  - Encourage IS  - Recommend bowel regimen

## 2024-09-17 NOTE — OP NOTE
Operative Report    Patient:  Shahnaz Tejada  :  [id number]  MRN:  [id number]  Date of Procedure:  24      Pre-operative Diagnosis:  Right Hip Degenerative Joint Disease    Post-operative Diagnosis:  Right Hip Degenerative Joint Disease    Operation: Right Total Hip Replacement    Surgeon:  Jocelyn Danielson MD    Assistant(s):  MARY ANN Boss    Anesthesia:  Spinal    Estimated Blood Loss:  200 mL    Specimens:  None    Drains:  None    Disposition: aroused from sedation, and taken to the recovery room in a stable condition      History of Present Illness:  Ms. Tejada is a 74 y.o. year old female who has been followed in the out-patient clinic with a history of progressively worsening right hip pain secondary to degenerative arthritis.  After having failed conservative, non-surgical attempts at managing the pain and limitations of functional capabilities, it was felt that the only remaining option was surgical.  A detailed conversation regarding the risks and benefits of hip arthroplasty surgery was had with the patient.  The risks discussed included but were not limited to: bleeding (which may or may not require transfusion), infection, damage to nerves or blood vessels, deep venous thrombosis, pulmonary embolus, prosthetic failure, loosening, prosthetic fracture, femur or pelvic fracture, dislocation, leg-length inequality, persistent pain, need for future surgery, medical complications (including cardiac, respiratory and neurologic complications), anaesthetic complications, and death.  Subsequent to this conversation, all of the patient's questions were answered in great detail and informed consent was obtained for a right total hip arthroplasty.  She received preoperative medical clearance and was felt optimized for surgery.    Description of Procedure:  After routine preparation and draping of the patient in the operating room, a clinical time-out was held confirming the correct patient name, MRN,  , planned procedure, site, antibiotic start time and agent, and outline of any surgical concerns.  All in attendance were in agreement to proceed.  A direct anterior approach was made to the right hip joint.  The acetabulum was reamed and prepared to accept a 54 millimeter acetabular shell.  The shell was impacted into place with supplemental screw fixation.  A 54x40 millimeter neutral liner was then inserted into the shell.  The femoral canal was prepared to accept a 6 high offset femoral stem.  The final stem was impacted into place, a 40 millimeter femoral head was used with a +1.5 millimeter neck length.  Trial and final reduction of the hip revealed good leg length, stability, and range of motion.  The wound was closed in layers with the skin closed using absorbable suture and a dressing was applied to the wound.    [employer] components were utilized for this procedure.    Implant Name Type Inv. Item Serial No.  Lot No. LRB No. Used Action   EMSYS SHL 3HOLE 54 - [id number]  EMSYS SHL 3HOLE 54  [employer] [id number] Right 1 Implanted   EMSYS LNR AOX N 54X40 - [id number]  EMSYS LNR AOX N 54X40  [employer] [id number] Right 1 Implanted   SCREW BONE CANCELLOUS 6.5 ROGELIO MM 35MM - [id number] Acetabular screw SCREW BONE CANCELLOUS 6.5 ROGELIO MM 35MM  [employer] [id number] Right 1 Implanted   SCREW BONE CANCELLOUS 6.5 ROGELIO MM 15MM - [id number] Acetabular screw SCREW BONE CANCELLOUS 6.5 ROGELIO MM 15MM  [employer] [id number] Right 1 Implanted   FEMORAL HEAD CERAMIC - [id number] Femoral head FEMORAL HEAD CERAMIC  [employer] [id number] Right 1 Implanted   STEM FEMORAL 12/14 TAPER - [id number]  STEM FEMORAL 12/14 TAPER  [employer] [id number] Right 1 Implanted       I was present and scrubbed throughout all the critical components of the operation.    Jocelyn Danielson MD

## 2024-09-17 NOTE — PROGRESS NOTES
Physical Therapy -  Initial Evaluation     Patient: Shahnaz Tejada  Location: [facility] 5PAV [id number]  MRN: [id number]  Today's date: 9/17/2024    HISTORY OF PRESENT ILLNESS     Shahnaz is a 74 y.o. female admitted on 9/17/2024 with Osteoarthritis of left hip, unspecified osteoarthritis type [M16.12] S/P hip replacement, right [Z96.641].     Past Medical History  Shahnaz has a past medical history of Anxiety, Arthritis, Depression, Hard of hearing, Hypoglycemia, Lipid disorder, and Transfusion history.    History of Present Illness   75 y/o female s/p right total hip arthroplasty - WBAT , no hip precautions    PRIOR LEVEL OF FUNCTION AND LIVING ENVIRONMENT     Prior Level of Function      Flowsheet Row Most Recent Value   Dominant Hand right   Ambulation independent   Transferring independent   Toileting independent   Bathing independent   Dressing independent   Eating independent   IADLs independent   Driving/Transportation    Communication understands/communicates without difficulty   Prior Level of Function Comment IND w/ all ADLs and mobility w/o AD.             Prior Living Environment      Flowsheet Row Most Recent Value   People in Home spouse   Current Living Arrangements home   Living Environment Comment lives with her  in 1SH with 5 ELISEO, stall with lip to enter          VITALS AND PAIN     PT Vitals      Date/Time Pulse SpO2 Pt Activity O2 Therapy BP BP Location BP Method Pt Position [facility]   09/17/24 1517 75 96 % At rest None (Room air) 158/79 Right upper arm Automatic Sitting VV          PT Pain      Date/Time Pain Type Side/Orientation Location Rating: Rest Rating: Activity Interventions [facility]   09/17/24 1517 Pain Assessment;Pain Reassessment right hip 3 2 care clustered VV           Therapy Pain/Vitals       Row Name 09/17/24 1517 09/17/24 1529       Pain/Comfort/Sleep    Pain Charting Type Pain Assessment;Pain Reassessment --    Preferred Pain Scale number (Numeric Rating  Pain Scale) --    (0-10) Pain Rating: Rest 3 --    (0-10) Pain Rating: Activity 2 --    Pain Side/Orientation right --    Pain Body Location hip --    Pain Management Interventions care clustered --       Patient Observation    Patient Observations -- NAD, asymptomatic, may be discrepency between arms       Vitals    Pulse 75 75    SpO2 96 % 95 %    Patient Activity At rest At rest    Oxygen Therapy None (Room air) None (Room air)    /79 129/69    BP Location Right upper arm Left upper arm    BP Method Automatic Automatic    Patient Position Sitting Sitting                       Objective   OBJECTIVE     Start time:  1455  End time:  1526  Session Length: 31 min  Mode of Treatment: co-treatment  Reason for co-treatment: skilled overlap w/ OT, expedite DC recs for PODO patient    General Observations  Patient received upright, in chair. She was agreeable to therapy, no issues or concerns identified by nurse prior to session. cleared by nurse, VSS/NAD, spouse present t/o eval, dtr present before PT Eval    Precautions: aspiration, cardiac, hip, fall, head of bed elevated 30 degrees, weight bearing (OOB day 0, SCD's, I+O, WBAT, no hip precs)  Hip Precautions: no precautions needed  Extremity Weight-Bearing Status: right lower extremity  Right LE Weight-Bearing Status: weight-bearing as tolerated (WBAT)    Limitations/Impairments: hearing (B hearing aides, repeating of vcs)   Services  Do You Speak a Language Other Than English at Home?: no      PT Eval and Treat - 09/17/24 1455          Cognition    Orientation Status oriented x 4     Affect/Mental Status WNL     Follows Commands WNL     Cognitive Function WNL        Vision Assessment/Intervention    Vision Assessment corrective lenses full-time;WFL        Hearing Assessment    Hearing Status hearing impairment, bilaterally;hearing aid, bilateral        Sensory Assessment    Sensory Assessment sensation intact, upper extremities;sensation intact, lower  extremities        Upper Extremity Assessment    UE Assessment ROM and Strength WFL        Lower Extremity Assessment    LE Assessment ROM and Strength WFL     General Observations hips not formally strength tested but WFL as seen through her func mob        Motor Skills    Motor Skills neuro-muscular function     Coordination WFL     Muscle Tone WNL     Neuromuscular Function WFL        Bed Mobility    Comment patient OOB and amb to bathroom w/ 5p staff earlier this pm        Mobility Belt    Mobility Belt Used During Session yes        Sit/Stand Transfer    Surface chair with arm rests     [place] supervision     Safety/Cues increased time to complete;verbal cues;hand placement;maintaining precautions;preparatory posture;sequencing;proper use of assistive device;technique     Assistive Device walker, front-wheeled     Transfer Comments STS Transfer w/ RW at S for vcs - hand use, hip precs reviewed for safety        Gait Training    [place], Gait supervision     Safety/Cues hand placement;gait deviations;maintaining precautions;proper use of assistive device;technique;preparatory posture;sequencing;verbal cues     Assistive Device walker, front-wheeled     Distance in Feet 40 feet     Pattern step-to     Deviations/Abnormal Patterns gait speed decreased;stride length decreased;weight shifting decreased;richard decreased;step length decreased     Comment amb approx 40 ft w/ RW at S for vcs to improve gait, posture, maintain hip precs, no c/o incr pain, chest pain, SOB, dizziness, HA, nausea, neuro or cardiac symptoms s/p session, VSS/NAD, nurse alerted        Balance    Static Sitting Balance WFL     Dynamic Sitting Balance WFL     Sit to Stand Dynamic Balance WFL     Static Standing Balance WFL     Dynamic Standing Balance mild impairment        Impairments/Safety Issues    Impairments Affecting Function range of motion (ROM);strength;pain                      10 Meter Walk Test (Self-Selected  Velocity)  Trial One: Ten Meter Walk Test (sec): 0 seconds  Trial Two: Ten Meter Walk Test (sec): 0 seconds  Trial Three: Ten Meter Walk Test (sec): 0 seconds             Education Documentation  Fall Prevention, taught by Cash Baig PT at 9/17/2024  5:57 PM.  Learner: Significant Other, Patient  Readiness: Acceptance  Method: Demonstration, Explanation  Response: Verbalizes Understanding, Demonstrated Understanding  Comment: xfers, GT w/ RW, PT POC, safety at home, fall risk reduction, skin protection, ADL's, dress/grooming, bathroom xfers, hip precs, WBAT, importance of early mob to prevent health risks, all patient and spouse concerns addressed    Equipment/Home Supplies, taught by Cash Baig PT at 9/17/2024  5:57 PM.  Learner: Significant Other, Patient  Readiness: Acceptance  Method: Demonstration, Explanation  Response: Verbalizes Understanding, Demonstrated Understanding  Comment: xfers, GT w/ RW, PT POC, safety at home, fall risk reduction, skin protection, ADL's, dress/grooming, bathroom xfers, hip precs, WBAT, importance of early mob to prevent health risks, all patient and spouse concerns addressed    Call Light Use, taught by Cash Baig PT at 9/17/2024  5:57 PM.  Learner: Significant Other, Patient  Readiness: Acceptance  Method: Demonstration, Explanation  Response: Verbalizes Understanding, Demonstrated Understanding  Comment: xfers, GT w/ RW, PT POC, safety at home, fall risk reduction, skin protection, ADL's, dress/grooming, bathroom xfers, hip precs, WBAT, importance of early mob to prevent health risks, all patient and spouse concerns addressed    VTE Symptoms, taught by Cash Baig PT at 9/17/2024  5:57 PM.  Learner: Significant Other, Patient  Readiness: Acceptance  Method: Demonstration, Explanation  Response: Verbalizes Understanding, Demonstrated Understanding  Comment: xfers, GT w/ RW, PT POC, safety at home, fall risk reduction, skin protection, ADL's, dress/grooming,  bathroom xfers, hip precs, WBAT, importance of early mob to prevent health risks, all patient and spouse concerns addressed    VTE Prevention, taught by Cash Baig PT at 9/17/2024  5:57 PM.  Learner: Significant Other, Patient  Readiness: Acceptance  Method: Demonstration, Explanation  Response: Verbalizes Understanding, Demonstrated Understanding  Comment: xfers, GT w/ RW, PT POC, safety at home, fall risk reduction, skin protection, ADL's, dress/grooming, bathroom xfers, hip precs, WBAT, importance of early mob to prevent health risks, all patient and spouse concerns addressed    Rehabilitation Therapy, taught by Cash Baig PT at 9/17/2024  5:57 PM.  Learner: Significant Other, Patient  Readiness: Acceptance  Method: Demonstration, Explanation  Response: Verbalizes Understanding, Demonstrated Understanding  Comment: xfers, GT w/ RW, PT POC, safety at home, fall risk reduction, skin protection, ADL's, dress/grooming, bathroom xfers, hip precs, WBAT, importance of early mob to prevent health risks, all patient and spouse concerns addressed        Session Outcome  Patient reclined, in chair, heel(s) elevated, leg(s) elevated at end of session, all needs met, call light in reach, personal items in reach, chair alarm on. Nursing notified about change in vital signs, patient's response to therapy/activity, patient's performance, and patient's position.    AM-PAC™ - Mobility (Current Function)     Turning form your back to your side while in flat bed without using bedrails 3 - A Little   Moving from lying on your back to sitting on the side of a flat bed without using bedrails 3 - A Little   Moving to and from a bed to a chair 3 - A Little   Standing up from a chair using your arms 3 - A Little   To walk in a hospital room 3 - A Little   Climbing 3-5 steps with a railing 3 - A Little   AM-PAC™ Mobility Score 18      ASSESSMENT AND PLAN     Progress Summary  S func mob, amb w/ RW at S, [facility] 18/24, DC home s/p  Wed 9/18 ortho class, home w/ family assist, OP PT when ordered by surgeon, issue RW    Patient/Family Therapy Goals Statement: go home tomorrow    PT Plan      Flowsheet Row Most Recent Value   Rehab Potential good, to achieve stated therapy goals at 09/17/2024 1455   Therapy Frequency daily at 09/17/2024 1455   Planned Therapy Interventions balance training, strengthening, transfer training, bed mobility training, patient/family education, ROM (range of motion), gait training, stair training, postural re-education at 09/17/2024 1455            PT Discharge Recommendations      Flowsheet Row Most Recent Value   PT Recommended Discharge Disposition home with assistance, home with outpatient services at 09/17/2024 1455   Anticipated Equipment Needs if Discharged Home (PT) walker, front-wheeled at 09/17/2024 1455          PT Goals      Flowsheet Row Most Recent Value   Bed Mobility Goal 1    Activity/Assistive Device bed mobility activities, all at 09/17/2024 1455   [place] independent at 09/17/2024 1455   Time Frame by discharge at 09/17/2024 1455   Progress/Outcome goal ongoing at 09/17/2024 1455   Transfer Goal 1    Activity/Assistive Device all transfers, walker, front-wheeled at 09/17/2024 1455   [place] modified independence at 09/17/2024 1455   Time Frame by discharge at 09/17/2024 1455   Progress/Outcome goal ongoing at 09/17/2024 1455   Gait Training Goal 1    Activity/Assistive Device gait (walking locomotion), walker, front-wheeled at 09/17/2024 1455   [place] modified independence at 09/17/2024 1455   Distance 125 ft at 09/17/2024 1455   Time Frame by discharge at 09/17/2024 1455   Progress/Outcome goal ongoing at 09/17/2024 1455   Stairs Goal 1    Activity/Assistive Device ascending stairs, descending stairs, cane, straight at 09/17/2024 1455   [place] modified independence at 09/17/2024 1455   Number of Stairs 4 at 09/17/2024 1455   Time Frame by discharge at 09/17/2024 1455    Progress/Outcome goal ongoing at 09/17/2024 3248

## 2024-09-17 NOTE — PLAN OF CARE
Problem: Adult Inpatient Plan of Care  Goal: Plan of Care Review  Outcome: Progressing  Flowsheets (Taken 9/17/2024 6127)  Progress: improving  Outcome Evaluation: OT eval completed. Pt is a 74 y.o. female s/p R CURRY. Pt requried SUP for STS, CLS for short distance func mobility w/ RW, and DEP for LBD 2* dcr func reach and LE mobility post-op. Edu on LH AE avail to incr IND /safety w/ LE ADLs. As well as WBS, no hip precautions, LE [place], and ortho class. Limited by balance, pain, activity tolerance, and RLE ROM/strength. Pt and spouse receptive to all edu and recs. OT to cont w/ POC in acute setting. Rec d/c home w/ (A) pending ortho class progress.  Plan of Care Reviewed With:   patient   spouse     Problem: Hip Arthroplasty  Goal: Optimal Functional Ability  Outcome: Progressing

## 2024-09-17 NOTE — ANESTHESIA PROCEDURE NOTES
Spinal Block    Patient location during procedure: OR  Staffing  Performed: anesthesiologist   Anesthesiologist: Jennifer Campos MD  Performed by: Jennifer Campos MD  Authorized by: Jennifer Campos MD    Reason for block: at surgeon's request  Preanesthetic Checklist  Completed: patient identified, surgical consent, pre-op evaluation, timeout performed, IV checked, risks and benefits discussed, monitors and equipment checked and sterile field maintained during procedure  Spinal Block  Patient position: sitting  Prep: ChloraPrep and site prepped and draped  Patient monitoring: heart rate, cardiac monitor, continuous pulse ox and blood pressure  Approach: midline  Location: L3-4  Injection technique: single-shot  Needle  Needle type: Quincke   Needle gauge: 22 G  Needle length: 3.5 in  Assessment  Events: cerebrospinal fluid  Additional Notes  Procedure well tolerated. Vital signs stable.

## 2024-09-17 NOTE — ANESTHESIA POSTPROCEDURE EVALUATION
Patient: Shahnaz Tejada    Procedure Summary       Date: 09/17/24 Room / Location: [facility] PAV OR 01 / [facility] OR PAV    Anesthesia Start: 0629 Anesthesia Stop: 0817    Procedure: Right Total Hip Arthroplasty (Right: Hip) Diagnosis:       Osteoarthritis of left hip, unspecified osteoarthritis type      (Osteoarthritis)    Surgeons: Jocelyn Danielson MD Responsible Provider: Jennifer Campos MD    Anesthesia Type: spinal ASA Status: 2            Anesthesia Type: spinal  PACU Vitals  9/17/2024 0813 - 9/17/2024 0913        9/17/2024  0821 9/17/2024  0830 9/17/2024  0845 9/17/2024  0900    BP: 100/56 117/62 138/67 135/77    Temp: 36.1 °C (96.9 °F) -- -- --    Pulse: 64 60 57 57    Resp: 16 12 22 13    SpO2: 100 % 100 % 86 % 100 %              Anesthesia Post Evaluation    Pain management: adequate  Mode of pain management: IV medication  Patient location during evaluation: PACU  Patient participation: complete - patient participated  Level of consciousness: awake and alert  Cardiovascular status: acceptable  Airway Patency: adequate  Respiratory status: acceptable  Hydration status: acceptable  Anesthetic complications: no

## 2024-09-17 NOTE — ASSESSMENT & PLAN NOTE
Asymptomatic  - Pain control  - Continue to monitor blood pressure; continue this at home  - Recommend outpatient PCP follow-up w/ BP recheck and if persistently HTNsive can discuss starting antihypertensive medications

## 2024-09-17 NOTE — PROGRESS NOTES
Orthopaedics (Hip) Progress Note - Postop Check    [S]  Patient comfortable in PACU. Pain currently well controlled.   Patient responds to questions appropriately and follows commands.    [O]   Vitals:    09/17/24 0821   BP: (!) 100/56   Pulse: 64   Resp: 16   Temp: 36.1 °C (96.9 °F)   SpO2: 100%       Physical Exam:    Right Lower Extremity  Inspection: Dressings clean dry intact  Motor: exam limited in the setting of neuraxial anesthesia  Sensory: exam limited in the setting of neuraxial anesthesia  Vascular: Palpable DP/PT pulses, Toes warm & well-perfused, <2 sec of capillary refill    [A/P]   74 y.o. female s/p Procedure(s):  Right Total Hip Arthroplasty with Dr. Danielson on 09/17/24    [ ] Motor Check Pending  [ ] Postoperative XR in the PACU  -- DVT prophylaxis: Aspirin 81 BID x4 weeks  -- Weightbearing Status: as tolerated  -- Pain control regimen  -- PT/OT Evaluation: pending    Harpal Pierce MD  Orthopaedic Surgery  Phone: [phone]

## 2024-09-17 NOTE — PLAN OF CARE
Problem: Adult Inpatient Plan of Care  Goal: Plan of Care Review  Flowsheets (Taken 9/17/2024 1841)  Progress: improving  Outcome Evaluation: Pt OOB ax1 with a walker. VSS. Voiding, Pt worked with PT/OT. Pain controlled with ATC tylenol and toradol. Pt is [employer] [employer] [place] and wears B/L hearing aids. Plan for physical therapy at 0930 tomorrow am and probable dc to home.  Plan of Care Reviewed With: patient

## 2024-09-17 NOTE — PROGRESS NOTES
Orthopedic Progress note:    75 y/o female s/p right total hip arthroplasty     Motor check:  - sensation intact to light touch  - active dorsiflexion, plantarflexion, extensor hallucis longus    A/P:   - pain control  - physical therapy/occupational therapy  - DVT prophylaxis  .

## 2024-09-17 NOTE — PLAN OF CARE
Problem: Hip Arthroplasty  Goal: Optimal Functional Ability  Outcome: Progressing     Problem: Adult Inpatient Plan of Care  Goal: Plan of Care Review  Outcome: Progressing  Flowsheets (Taken 9/17/2024 4239)  Progress: improving  Outcome Evaluation: S func mob, amb w/ RW at S, [facility] 18/24, DC home s/p Wed 9/18 ortho class, home w/ family assist, OP PT when ordered by surgeon, issue RW  Plan of Care Reviewed With:   patient   spouse

## 2024-09-17 NOTE — ANESTHESIOLOGIST PRE-PROCEDURE ATTESTATION
Pre-Procedure Patient Identification:  I am the Primary Anesthesiologist and have identified the patient on 09/17/24 at 6:10 AM.   I have confirmed the procedure(s) will be performed by the following surgeon/proceduralist Jocelyn Danielson MD.

## 2024-09-17 NOTE — CONSULTS
Hospital Medicine -  Daily Progress Note       SUBJECTIVE   Interval History: pt seen and examined in the pacu. No chest pain/dyspnea.      OBJECTIVE      Vital signs in last 24 hours:  Temp:  [36.1 °C (96.9 °F)-36.5 °C (97.7 °F)] 36.5 °C (97.7 °F)  Heart Rate:  [49-64] 50  Resp:  [11-22] 11  BP: (100-168)/(56-86) 145/80    Intake/Output Summary (Last 24 hours) at 9/17/2024 1020  Last data filed at 9/17/2024 0804  Gross per 24 hour   Intake 1100 ml   Output 300 ml   Net 800 ml       PHYSICAL EXAMINATION      Physical Exam  Constitutional:       Appearance: She is not diaphoretic.   Eyes:      General: No scleral icterus.  Cardiovascular:      Rate and Rhythm: Regular rhythm.      Heart sounds:      No friction rub. No gallop.   Pulmonary:      Effort: No respiratory distress.      Breath sounds: No stridor. No wheezing, rhonchi or rales.   Abdominal:      General: Bowel sounds are normal.      Palpations: Abdomen is soft.   Skin:     General: Skin is warm.   Neurological:      Mental Status: She is alert.      Comments: Alert/awake. No slurred speech        LINES, CATHETERS, DRAINS, AIRWAYS, AND WOUNDS   Lines, Drains, Airways, Wounds:  Peripheral IV (Adult) 09/17/24 Left;Posterior Hand (Active)   Number of days: 0       Surgical Incision Hip Anterior;Right (Active)   Number of days: 0       Comments:      LABS / IMAGING / TELE      Labs  Results from last 7 days   Lab Units 09/11/24  1429   WBC K/uL 7.05   HEMOGLOBIN g/dL 12.5   HEMATOCRIT % 38.7   PLATELETS K/uL 187     Results from last 7 days   Lab Units 09/11/24  1429   SODIUM mEQ/L 139   POTASSIUM mEQ/L 3.8   CHLORIDE mEQ/L 107   CO2 mEQ/L 27   BUN mg/dL 23   CREATININE mg/dL 0.9   CALCIUM mg/dL 9.0   GLUCOSE mg/dL 91       IMAGING  No results found.    ASSESSMENT AND PLAN      Status post hip surgery  Assessment & Plan  S/p Right Total Hip Arthroplasty with Dr. Danielson on 09/17/24   Post op pain management per ortho team. rec PT/OT eval and treat. rec  DVT proph per ortho rec. Further management per ortho surgery team. My colleague, Dr. Newsome, [facility] team, will be on duty for further assistance medically. Please notify him with any medical questions or concerns     Elevated blood pressure reading without diagnosis of hypertension  Assessment & Plan  Asymptomatic  Cont to monitor BP during perioperative period. If pt's blood pressure is persistently elevated or if pt becomes symptomatic, consider starting pt on an anti-hypertensive med.      Depression  Assessment & Plan  Cont w lexapro    Hyperlipidemia  Assessment & Plan  Cont w statin.          VTE Prophylaxis Plan: Pharmacological DVT prophylaxis and timing of such per the discretion of the surgeon. Strongly recommend maintain sequential compression device   Code Status: No Order  Estimated Discharge Date: 9/18/2024     Deepak Min DO  9/17/2024

## 2024-09-18 VITALS
HEART RATE: 80 BPM | SYSTOLIC BLOOD PRESSURE: 160 MMHG | RESPIRATION RATE: 18 BRPM | TEMPERATURE: 98.6 F | OXYGEN SATURATION: 96 % | DIASTOLIC BLOOD PRESSURE: 70 MMHG

## 2024-09-18 LAB
ANION GAP SERPL CALC-SCNC: 4 MEQ/L (ref 3–15)
BUN SERPL-MCNC: 17 MG/DL (ref 7–25)
CALCIUM SERPL-MCNC: 7.7 MG/DL (ref 8.6–10.3)
CHLORIDE SERPL-SCNC: 108 MEQ/L (ref 98–107)
CO2 SERPL-SCNC: 26 MEQ/L (ref 21–31)
CREAT SERPL-MCNC: 0.7 MG/DL (ref 0.6–1.2)
EGFRCR SERPLBLD CKD-EPI 2021: >60 ML/MIN/1.73M*2
ERYTHROCYTE [DISTWIDTH] IN BLOOD BY AUTOMATED COUNT: 13.7 % (ref 11.7–14.4)
GLUCOSE SERPL-MCNC: 108 MG/DL (ref 70–99)
HCT VFR BLD AUTO: 31.2 % (ref 35–45)
HGB BLD-MCNC: 10.1 G/DL (ref 11.8–15.7)
MCH RBC QN AUTO: 29.1 PG (ref 28–33.2)
MCHC RBC AUTO-ENTMCNC: 32.4 G/DL (ref 32.2–35.5)
MCV RBC AUTO: 89.9 FL (ref 83–98)
PDW BLD AUTO: 10.7 FL (ref 9.4–12.3)
PLATELET # BLD AUTO: 132 K/UL (ref 150–369)
POTASSIUM SERPL-SCNC: 3.8 MEQ/L (ref 3.5–5.1)
RBC # BLD AUTO: 3.47 M/UL (ref 3.93–5.22)
SODIUM SERPL-SCNC: 138 MEQ/L (ref 136–145)
WBC # BLD AUTO: 7.86 K/UL (ref 3.8–10.5)

## 2024-09-18 PROCEDURE — 99232 SBSQ HOSP IP/OBS MODERATE 35: CPT | Performed by: HOSPITALIST

## 2024-09-18 PROCEDURE — 97150 GROUP THERAPEUTIC PROCEDURES: CPT | Mod: GO,CO

## 2024-09-18 PROCEDURE — 63700000 HC SELF-ADMINISTRABLE DRUG: Performed by: NURSE PRACTITIONER

## 2024-09-18 PROCEDURE — 63600000 HC DRUGS/DETAIL CODE: Performed by: NURSE PRACTITIONER

## 2024-09-18 PROCEDURE — 97535 SELF CARE MNGMENT TRAINING: CPT | Mod: GO,CO,59

## 2024-09-18 PROCEDURE — 97150 GROUP THERAPEUTIC PROCEDURES: CPT | Mod: GP,CQ

## 2024-09-18 PROCEDURE — 80048 BASIC METABOLIC PNL TOTAL CA: CPT | Performed by: NURSE PRACTITIONER

## 2024-09-18 PROCEDURE — 63600000 HC DRUGS/DETAIL CODE: Mod: JZ,JG

## 2024-09-18 PROCEDURE — 97116 GAIT TRAINING THERAPY: CPT | Mod: GP,CQ,59

## 2024-09-18 PROCEDURE — 36415 COLL VENOUS BLD VENIPUNCTURE: CPT | Performed by: NURSE PRACTITIONER

## 2024-09-18 PROCEDURE — 85027 COMPLETE CBC AUTOMATED: CPT | Performed by: NURSE PRACTITIONER

## 2024-09-18 RX ORDER — CEPHALEXIN 500 MG/1
500 CAPSULE ORAL 3 TIMES DAILY
Qty: 42 CAPSULE | Refills: 0 | Status: SHIPPED | OUTPATIENT
Start: 2024-09-18 | End: 2024-10-02

## 2024-09-18 RX ORDER — CALCIUM GLUCONATE 20 MG/ML
1 INJECTION, SOLUTION INTRAVENOUS ONCE
Status: COMPLETED | OUTPATIENT
Start: 2024-09-18 | End: 2024-09-18

## 2024-09-18 RX ORDER — AMOXICILLIN 250 MG
1 CAPSULE ORAL 2 TIMES DAILY
Start: 2024-09-18 | End: 2024-10-18

## 2024-09-18 RX ORDER — ACETAMINOPHEN 325 MG/1
650 TABLET ORAL EVERY 6 HOURS PRN
Start: 2024-09-18 | End: 2024-10-18

## 2024-09-18 RX ORDER — POLYETHYLENE GLYCOL 3350 17 G/17G
17 POWDER, FOR SOLUTION ORAL DAILY PRN
Start: 2024-09-18 | End: 2024-09-21

## 2024-09-18 RX ORDER — MELOXICAM 15 MG/1
15 TABLET ORAL DAILY
Qty: 30 TABLET | Refills: 0 | Status: SHIPPED | OUTPATIENT
Start: 2024-09-18 | End: 2024-10-18

## 2024-09-18 RX ORDER — NALOXONE HYDROCHLORIDE 4 MG/.1ML
1 SPRAY NASAL ONCE AS NEEDED
Qty: 1 EACH | Refills: 0 | Status: SHIPPED | OUTPATIENT
Start: 2024-09-18

## 2024-09-18 RX ORDER — NAPROXEN SODIUM 220 MG/1
81 TABLET, FILM COATED ORAL 2 TIMES DAILY
Start: 2024-09-18 | End: 2024-10-18

## 2024-09-18 RX ORDER — OXYCODONE HYDROCHLORIDE 5 MG/1
5-10 TABLET ORAL EVERY 4 HOURS PRN
Qty: 30 TABLET | Refills: 0 | Status: SHIPPED | OUTPATIENT
Start: 2024-09-18 | End: 2024-09-23

## 2024-09-18 RX ADMIN — CEPHALEXIN 500 MG: 500 CAPSULE ORAL at 08:16

## 2024-09-18 RX ADMIN — DOCUSATE SODIUM AND SENNOSIDES 1 TABLET: 8.6; 5 TABLET, FILM COATED ORAL at 08:16

## 2024-09-18 RX ADMIN — ESCITALOPRAM OXALATE 5 MG: 5 TABLET, FILM COATED ORAL at 08:16

## 2024-09-18 RX ADMIN — CALCIUM GLUCONATE 1 G: 20 INJECTION, SOLUTION INTRAVENOUS at 06:28

## 2024-09-18 RX ADMIN — DEXAMETHASONE SODIUM PHOSPHATE 8 MG: 4 INJECTION, SOLUTION INTRA-ARTICULAR; INTRALESIONAL; INTRAMUSCULAR; INTRAVENOUS; SOFT TISSUE at 06:10

## 2024-09-18 RX ADMIN — ACETAMINOPHEN 650 MG: 325 TABLET ORAL at 11:39

## 2024-09-18 RX ADMIN — ACETAMINOPHEN 650 MG: 325 TABLET ORAL at 06:10

## 2024-09-18 RX ADMIN — POLYETHYLENE GLYCOL 3350 17 G: 17 POWDER, FOR SOLUTION ORAL at 08:16

## 2024-09-18 RX ADMIN — ASPIRIN 81 MG CHEWABLE TABLET 81 MG: 81 TABLET CHEWABLE at 08:16

## 2024-09-18 RX ADMIN — KETOROLAC TROMETHAMINE 15 MG: 15 INJECTION, SOLUTION INTRAMUSCULAR; INTRAVENOUS at 06:10

## 2024-09-18 RX ADMIN — CEPHALEXIN 500 MG: 500 CAPSULE ORAL at 13:09

## 2024-09-18 ASSESSMENT — COGNITIVE AND FUNCTIONAL STATUS - GENERAL
WALKING IN HOSPITAL ROOM: 4 - NONE
HELP NEEDED FOR PERSONAL GROOMING: 4 - NONE
WALKING IN HOSPITAL ROOM: 3 - A LITTLE
CLIMB 3 TO 5 STEPS WITH RAILING: 4 - NONE
AFFECT: WFL
MOVING TO AND FROM BED TO CHAIR: 3 - A LITTLE
TOILETING: 3 - A LITTLE
CLIMB 3 TO 5 STEPS WITH RAILING: 3 - A LITTLE
STANDING UP FROM CHAIR USING ARMS: 4 - NONE
MOVING TO AND FROM BED TO CHAIR: 4 - NONE
DRESSING REGULAR LOWER BODY CLOTHING: 3 - A LITTLE
EATING MEALS: 4 - NONE
HELP NEEDED FOR BATHING: 3 - A LITTLE
STANDING UP FROM CHAIR USING ARMS: 3 - A LITTLE
DRESSING REGULAR UPPER BODY CLOTHING: 4 - NONE

## 2024-09-18 NOTE — PROGRESS NOTES
Orthopaedics (Hip) Progress Note -     [S]  Patient resting comfortably in bedside chair, doing well this AM. POOD1 hemoglobin is 10.1. calcium repleted this AM. Will plan to work with PT this AM and d/c home when cleared.    [O]   Vitals:    09/18/24 0800   BP: (!) 156/83   Pulse: 70   Resp: 18   Temp: 37 °C (98.6 °F)   SpO2: 96%       Physical Exam:    Right Lower Extremity  Inspection: Dressings clean dry intact  Motor: exam limited in the setting of neuraxial anesthesia  Sensory: exam limited in the setting of neuraxial anesthesia  Vascular: Palpable DP/PT pulses, Toes warm & well-perfused, <2 sec of capillary refill    [A/P]   74 y.o. female s/p Procedure(s):  Right Total Hip Arthroplasty with Dr. Danielson on 09/18/24      -- DVT prophylaxis: Aspirin 81 BID x4 weeks  -- Weightbearing Status: as tolerated  -- Pain control regimen  -- PT/OT Evaluation: pending    Harpal Pierce MD  Orthopaedic Surgery  Phone: [phone]

## 2024-09-18 NOTE — PROGRESS NOTES
Hospital Medicine     Daily Progress Note       SUBJECTIVE   Interval History:   Patient is doing well today. She notes improvement of her pain, was 6/10 last night, improved to 3/10 w/ movement today.   She notes that she had a mild HA overnight, improved today. She reports that she had nausea earlier- this has resolved.   She denies HA, CP, dyspnea, abdominal pain, vomiting.      OBJECTIVE      Vital signs in last 24 hours:  Temp:  [36.2 °C (97.2 °F)-37.1 °C (98.7 °F)] 37 °C (98.6 °F)  Heart Rate:  [60-81] 80  Resp:  [14-20] 18  BP: (126-168)/(68-93) 168/80    Intake/Output Summary (Last 24 hours) at 9/18/2024 1011  Last data filed at 9/18/2024 0628  Gross per 24 hour   Intake 1601.33 ml   Output 1600 ml   Net 1.33 ml       PHYSICAL EXAMINATION      Physical Exam  Vitals and nursing note reviewed.   Constitutional:       General: She is not in acute distress.     Appearance: Normal appearance. She is not toxic-appearing.   HENT:      Head: Normocephalic and atraumatic.   Eyes:      Conjunctiva/sclera: Conjunctivae normal.   Cardiovascular:      Rate and Rhythm: Normal rate and regular rhythm.      Heart sounds: Normal heart sounds.   Pulmonary:      Effort: Pulmonary effort is normal. No respiratory distress.      Breath sounds: Normal breath sounds.   Abdominal:      General: Bowel sounds are normal. There is no distension.      Palpations: Abdomen is soft.      Tenderness: There is no abdominal tenderness.   Musculoskeletal:      Cervical back: Neck supple.   Skin:     General: Skin is warm and dry.   Neurological:      Mental Status: She is oriented to person, place, and time.   Psychiatric:         Mood and Affect: Mood normal.        LINES, CATHETERS, DRAINS, AIRWAYS, AND WOUNDS   Lines, Drains, and Airways:  Wounds (agree with documentation and present on admission):  Surgical Incision Hip Anterior;Right (Active)   Number of days: 1         Comments:    LABS / IMAGING / TELE      Labs  Results from last  7 days   Lab Units 09/18/24  0407 09/11/24  1429   SODIUM mEQ/L 138 139   POTASSIUM mEQ/L 3.8 3.8   CHLORIDE mEQ/L 108* 107   CO2 mEQ/L 26 27   BUN mg/dL 17 23   CREATININE mg/dL 0.7 0.9   CALCIUM mg/dL 7.7* 9.0   GLUCOSE mg/dL 108* 91     Results from last 7 days   Lab Units 09/18/24  0407 09/11/24  1429   WBC K/uL 7.86 7.05   HEMOGLOBIN g/dL 10.1* 12.5   HEMATOCRIT % 31.2* 38.7   PLATELETS K/uL 132* 187     Microbiology Results       ** No results found for the last 720 hours. **          Above labs have been personally reviewed.     ECG/Telemetry  Patient is not on telemetry.    ASSESSMENT AND PLAN              * Status post hip surgery  Assessment & Plan  S/p Right Total Hip Arthroplasty with Dr. Danielson on 09/17/24   - Management as per Ortho  - Pain control, DVT ppx, WBS, PT/OT as per Ortho  - Encourage IS  - Recommend bowel regimen    Elevated blood pressure reading without diagnosis of hypertension  Assessment & Plan  Asymptomatic  - Pain control  - Continue to monitor blood pressure; continue this at home  - Recommend outpatient PCP follow-up w/ BP recheck and if persistently HTNsive can discuss starting antihypertensive medications    Depression  Assessment & Plan  - Continue Lexapro    Hyperlipidemia  Assessment & Plan  - Continue statin      VTE Assessment: [place]    VTE Prophylaxis:  Current anticoagulants:    None      Code Status: Full Code      Estimated Discharge Date: 9/18/2024     Disposition Planning: as per Ortho     CESAR Mar  9/18/2024

## 2024-09-18 NOTE — PROGRESS NOTES
Physical Therapy -  Daily Treatment/Progress Note     Patient: Shahnaz Tejada  Location: [facility] [id number]  MRN: [id number]  Today's date: 9/18/2024    HISTORY OF PRESENT ILLNESS     Shahnaz is a 74 y.o. female admitted on 9/17/2024 with Osteoarthritis of left hip, unspecified osteoarthritis type [M16.12]  S/P hip replacement, right [Z96.641]. Principal problem is No Principal Problem: There is no principal problem currently on the Problem List. Please update the Problem List and refresh..    Past Medical History  Shahnaz has a past medical history of Anxiety, Arthritis, Depression, Hard of hearing, Hypoglycemia, Lipid disorder, and Transfusion history.    History of Present Illness   73 y/o female s/p right total hip arthroplasty - WBAT , no hip precautions    PRIOR LEVEL OF FUNCTION AND LIVING ENVIRONMENT     Prior Level of Function      Flowsheet Row Most Recent Value   Dominant Hand right   Ambulation independent   Transferring independent   Toileting independent   Bathing independent   Dressing independent   Eating independent   IADLs independent   Driving/Transportation    Communication understands/communicates without difficulty   Prior Level of Function Comment IND w/ all ADLs and mobility w/o AD.             Prior Living Environment      Flowsheet Row Most Recent Value   People in Home spouse   Current Living Arrangements home   Living Environment Comment lives with her  in 1SH with 5 ELISEO, stall with lip to enter          VITALS AND PAIN     PT Vitals      Date/Time Pulse HR Source BP [facility]   09/18/24 1003 80 Monitor 168/80 MTC          PT Pain      Date/Time Side/Orientation Location Rating: Rest Rating: Activity Interventions [facility]   09/18/24 1003 right hip 2 5 care clustered MTC             Objective   OBJECTIVE     Start time:  0958  End time:  1052  Session Length: 54 min  Mode of Treatment: group therapy    General Observations  Patient received in therapy gym. She  was agreeable to therapy, no issues or concerns identified by nurse prior to session. Pt rec'd at gym    Precautions: fall, hip, weight bearing  Hip Precautions: no precautions needed  Extremity Weight-Bearing Status: right lower extremity  Right LE Weight-Bearing Status: weight-bearing as tolerated (WBAT)           PT Eval and Treat - 09/18/24 0949          Cognition    Orientation Status oriented x 4     Affect/Mental Status WFL     Follows Commands WFL     Cognitive Function WFL        Mobility Belt    Mobility Belt Used During Session yes        Sit/Stand Transfer    [place] modified independence     Assistive Device walker, front-wheeled        Car Transfer    Transfer Technique stand-sit     [place] modified independence     Assistive Device walker, front-wheeled        Gait Training    [place], Gait distant supervision     Safety/Cues increased time to complete;sequencing;technique     Assistive Device walker, front-wheeled     Distance in Feet 110 feet     Pattern step-through;step-to     Deviations/Abnormal Patterns antalgic;gait speed decreased;step length decreased;stride length decreased;weight shifting decreased     Advanced Gait Activity curb negotiation     Comment Vc's for heel strike and toe off, with step-through sequencing technique. Verbalized understanding of techniques educated.        Curb Negotiation    [place] modified independence     Assistive Device walker, front-wheeled        Stairs Training    [place], Stairs distant supervision     Safety/Cues increased time to complete;technique;sequencing     Assistive Device cane, straight;railing     Handrail Location (Stairs) right side (ascending)     Number of Stairs 4     Ascending Stairs Technique step-to-step     Descending Stairs Technique step-to-step     Comment Pt req'd cues for management of assistive device and cues for sequencing for BLE's. Recommending Supervision on stairs once d/c'd. Pt's family present for  treatment. Pt educated on how family can provide assistance / supervision for safety. Verbalized understanding of techniques educated.        Balance    Static Sitting Balance WFL     Dynamic Sitting Balance WFL     Sit to Stand Dynamic Balance WFL     Static Standing Balance WFL     Dynamic Standing Balance WFL     Comment, Balance No overt LOB's        Lower Extremity (Therapeutic Exercise)    Exercise Position/Type seated     General Exercise bilateral;ankle pumps;quad sets;heel slides;LAQ (long arc quad);gluteal sets     Reps and Sets x10     Comment Pt educated on TE's to perform to increase functional ROM and strength of BLE's.                      10 Meter Walk Test (Self-Selected Velocity)  Trial One: Ten Meter Walk Test (sec): 16 seconds  Trial Two: Ten Meter Walk Test (sec): 17 seconds  Trial One: Gait Speed (m/s): 0.38 m/s  Trial Two: Gait Speed (m/s): 0.35 m/s  Average Gait Speed (m/s): Two Trials: 0.37 m/s                Session Outcome  Patient in therapy gym at end of session, returned to room by therapy aide. Nursing notified about patient's performance.    AM-PAC™ - Mobility (Current Function)     Turning form your back to your side while in flat bed without using bedrails 4 - None   Moving from lying on your back to sitting on the side of a flat bed without using bedrails 4 - None   Moving to and from a bed to a chair 4 - None   Standing up from a chair using your arms 4 - None   To walk in a hospital room 4 - None   Climbing 3-5 steps with a railing 4 - None   AM-PAC™ Mobility Score 24      ASSESSMENT AND PLAN     Progress Summary  [place] with mobility. Training conducted with negotiation of safe functional mobility techniques which included gait w/ RW and transfer techniques. Pt. demo good carryover with instructions. Also educated in application of surgical precautions with daily activities/mobility. Educated on TE's to assist with recovery following surgery to promote healing and strengthening of  BLE's. Pt's family present for treatment. Pt educated on how family can provide assistance / supervision for safety. Pt cleared PT gym.    Patient/Family Therapy Goals Statement: go home tomorrow    PT Plan      Flowsheet Row Most Recent Value   Rehab Potential good, to achieve stated therapy goals at 09/17/2024 1455   Therapy Frequency daily at 09/17/2024 1455   Planned Therapy Interventions balance training, strengthening, transfer training, bed mobility training, patient/family education, ROM (range of motion), gait training, stair training, postural re-education at 09/17/2024 1455            PT Discharge Recommendations      Flowsheet Row Most Recent Value   PT Recommended Discharge Disposition home with assistance, home with outpatient services at 09/17/2024 1455   Anticipated Equipment Needs if Discharged Home (PT) walker, front-wheeled  [RW issued-verbalizes understanding of proper care/maintenance and use of RW after demonstration/instruction.] at 09/18/2024 0958                 PT Goals      Flowsheet Row Most Recent Value   Bed Mobility Goal 1    Activity/Assistive Device bed mobility activities, all at 09/17/2024 1455   [place] independent at 09/17/2024 1455   Time Frame by discharge at 09/17/2024 1455   Progress/Outcome goal ongoing at 09/17/2024 1455   Transfer Goal 1    Activity/Assistive Device all transfers, walker, front-wheeled at 09/17/2024 1455   [place] modified independence at 09/17/2024 1455   Time Frame by discharge at 09/17/2024 1455   Progress/Outcome goal ongoing at 09/17/2024 1455   Gait Training Goal 1    Activity/Assistive Device gait (walking locomotion), walker, front-wheeled at 09/17/2024 1455   [place] modified independence at 09/17/2024 1455   Distance 125 ft at 09/17/2024 1455   Time Frame by discharge at 09/17/2024 1455   Progress/Outcome goal ongoing at 09/17/2024 1455   Stairs Goal 1    Activity/Assistive Device ascending stairs, descending stairs, cane, straight  at 09/17/2024 1455   [place] modified independence at 09/17/2024 1455   Number of Stairs 4 at 09/17/2024 1455   Time Frame by discharge at 09/17/2024 1455   Progress/Outcome goal ongoing at 09/17/2024 1451

## 2024-09-18 NOTE — PLAN OF CARE
Care Coordination Discharge Plan Note     Discharge Needs Assessment  Concerns to be Addressed:    Current Discharge Risk:      Anticipated Discharge Plan  Anticipated Discharge Disposition:         Patient Choice  Offered/Gave Vendor List:    Patient's Choice of Community Agency(s):           ---------------------------------------------------------------------------------------------------------------------    Interdisciplinary Discharge Plan Review:  Participants:     Concerns Comments: Discharged prior to case management assessment.    Discharge Plan:   Disposition/Destination: Home  /    Discharge Facility:    Community Resources:      Discharge Transportation:  Is Out of Hospital DNR needed at Discharge:    Does patient need discharge transport?

## 2024-09-18 NOTE — PLAN OF CARE
Plan of Care Review  Plan of Care Reviewed With: patient  Progress: improving  Outcome Evaluation: Pt assist x1 w/ RW to BR. Pain managed with ATC tylenol, toradol, and PRN oxycodone. Voiding w/out difficulty. NS @ 80 ml/hr. Optifoam dressing CDI. Plan for ortho class at 930 tmr morning. Plan to d/c home when medically cleared.

## 2024-09-18 NOTE — PROGRESS NOTES
Occupational Therapy -  Daily Treatment/Progress Note     Patient: Shahnaz Tejada  Location: [facility] [id number]  MRN: [id number]  Today's date: 9/18/2024    HISTORY OF PRESENT ILLNESS     Shahnaz is a 74 y.o. female admitted on 9/17/2024 with Osteoarthritis of left hip, unspecified osteoarthritis type [M16.12]  S/P hip replacement, right [Z96.641]. Principal problem is No Principal Problem: There is no principal problem currently on the Problem List. Please update the Problem List and refresh..    Past Medical History  Shahnaz has a past medical history of Anxiety, Arthritis, Depression, Hard of hearing, Hypoglycemia, Lipid disorder, and Transfusion history.    History of Present Illness   75 y/o female s/p right total hip arthroplasty - WBAT , no hip precautions    PRIOR LEVEL OF FUNCTION AND LIVING ENVIRONMENT     Prior Level of Function      Flowsheet Row Most Recent Value   Dominant Hand right   Ambulation independent   Transferring independent   Toileting independent   Bathing independent   Dressing independent   Eating independent   IADLs independent   Driving/Transportation    Communication understands/communicates without difficulty   Prior Level of Function Comment IND w/ all ADLs and mobility w/o AD.             Prior Living Environment      Flowsheet Row Most Recent Value   People in Home spouse   Current Living Arrangements home   Living Environment Comment lives with her  in 1SH with 5 ELISEO, stall with lip to enter          Occupational Profile      Flowsheet Row Most Recent Value   Successful Occupations is active, does aerobic exercise videos   Occupational History/Life Experiences retired teacher, enjoys gardening   Environmental Supports and Barriers From home with spouse          VITALS AND PAIN     OT Vitals      Date/Time Pulse HR Source BP [facility]   09/18/24 1003 80 Monitor 168/80 MTC          OT Pain      Date/Time Side/Orientation Location Rating: Rest Rating:  Activity Interventions [facility]   09/18/24 1003 hip right 2 5 care clustered MTC             Objective   OBJECTIVE     Start time:  1003  End time:  1038  Session Length: 35 min  Mode of Treatment: group therapy    General Observations  Patient received in chair, in therapy gym. She was no issues or concerns identified by nurse prior to session, agreeable to therapy.      Precautions: fall, hip, weight bearing  Hip Precautions: no precautions needed  Extremity Weight-Bearing Status: right lower extremity  Right LE Weight-Bearing Status: weight-bearing as tolerated (WBAT)    Limitations/Impairments: hearing      OT Eval and Treat - 09/18/24 1003          Cognition    Orientation Status oriented x 4     Follows Commands WNL        Bed Mobility    Bed Mobility Activities supine to sit;sit to supine     [place] independent     Safety/Cues verbal cues;technique;preparatory posture     Assistive Device none     Comment (I) in<>out of flat bed without use of bed features. Cues provided for technique.        Mobility Belt    Mobility Belt Used During Session yes        Sit/Stand Transfer    Surface chair with arm rests;edge of bed     [place] modified independence     Safety/Cues technique;preparatory posture;verbal cues     Assistive Device walker, front-wheeled     Transfer Comments Mod I for all transfers this session, utiliizing RW. Cues provided for safe preparatory posture and hand placement, pt receptive.        Toilet Transfer    Transfer Technique sit/stand     [place] modified independence     Safety/Cues verbal cues;preparatory posture     Assistive Device walker, front-wheeled     Comment Pt provided both verbal education and visual demonstration of toilet transfer. Pt provided education on safe transfer techniques as well as AD/AE avaiable for increased safety. Mod (I) to<>from toilet with seat riser to mimic home set up.        Shower/Tub Transfer    Transfer Type Shower     Transfer Technique step  over, right entry     [place] modified independence     Comment Pt provided both verbal education and visual demonstration of safe tub transfer. Pt education on use of tub bench/chair for increased safety during transfer. Educated on transfer techniques for each AD. Recommending pt have close assist for inital trasnfer to ensure safety. Mod I in<>out of WIS with use of RW.        Functional Mobility    Distance in therapy gym     Functional Mobility [place] modified independence     Safety/Cues verbal cues;proper use of assistive device;sequencing     Assistive Device walker, front-wheeled     Functional Mobility Comments Mod I for all mobility this session, utiliizing RW. No overt LOB noted.        Bathing    Comment Pt educated on techniques for increased safety with bathing. Demonstration and verbal education provided on AE/AD to increase energy conservation during task, pt receptive.        Lower Body Dressing    Comment Pt provided extensive education/demonstration of LB dressing AE to increased independance, energy conservation and safety. Pt educated on adaptive techniques such as Figure 4 Method as well as dressing affected limb first, for increased ease. Pt receptive to education provided, verbilizing unerstanding.        Toileting    Comment Pt provided education on AD's to increased independance and energy conservation. Pt provided education on 3-1 commode and raised toilet seats as needed for increased (I), pt receptive.        Balance    Static Sitting Balance WFL     Dynamic Sitting Balance WFL     Sit to Stand Dynamic Balance WFL     Static Standing Balance WFL     Dynamic Standing Balance WFL                     Functional Reach Test  Trial One: Functional Reach Test (in): 12 inches  Trial Two: Functional Reach Test (in): 13 inches  Average (in): 12.5 inches             Session Outcome  Patient in therapy gym, in chair at end of session, returned to room by therapy aide. Nursing notified  about patient's performance and change in vital signs.    AM-PAC™ - ADL (Current Function)     Putting on/taking off regular lower body clothing 3 - A Little   Bathing 3 - A Little   Toileting 3 - A Little   Putting on/taking off regular upper body clothing 4 - None   Help for taking care of personal grooming 4 - None   Eating meals 4 - None   AM-PAC™ ADL Score 21      ASSESSMENT AND PLAN     Progress Summary  Pt agreeable to therapy this date. Pt demonstrating good safety/balance this session with use of RW. Pt is functioing at an overall Mod (I) level with all transfers/mobility. Pt provided extensive verbal education as well as visual demonstration of AE available for transfer and bathing/dressing, pt receptive to education. Recommending pt return home with assist at d/c. Pt reporting comfortable to return home at her current functional level with spouse assist.    Patient/Family Therapy Goal Statement: To return home at d/c    OT Plan      Flowsheet Row Most Recent Value   Rehab Potential good, to achieve stated therapy goals at 09/17/2024 1515   Therapy Frequency daily at 09/17/2024 1515   Planned Therapy Interventions activity tolerance training, adaptive equipment training, BADL retraining, functional balance retraining, occupation/activity based interventions, patient/caregiver education/training, transfer/mobility retraining at 09/17/2024 1515            OT Discharge Recommendations      Flowsheet Row Most Recent Value   OT Recommended Discharge Disposition home with assistance at 09/17/2024 1515   Anticipated Equipment Needs if Discharged Home (OT) walker, front-wheeled, dressing equipment, shower chair, commode, 3-in-1 at 09/17/2024 1515                 OT Goals      Flowsheet Row Most Recent Value   Bed Mobility Goal 1    Activity/Assistive Device bed mobility activities, all at 09/17/2024 1515   [place] modified independence at 09/17/2024 1515   Time Frame 3-5 days at 09/17/2024 1515    Progress/Outcome goal ongoing at 09/17/2024 1515   Transfer Goal 1    Activity/Assistive Device sit-to-stand/stand-to-sit, toilet at 09/17/2024 1515   [place] modified independence at 09/17/2024 1515   Time Frame 3-5 days at 09/17/2024 1515   Progress/Outcome goal ongoing at 09/17/2024 1515   Transfer Goal 2    Activity/Assistive Device shower, tub at 09/17/2024 1515   [place] supervision required at 09/17/2024 1515   Time Frame 3-5 days at 09/17/2024 1515   Progress/Outcome goal ongoing at 09/17/2024 1515   Dressing Goal 1    Activity/Adaptive Equipment lower body dressing at 09/17/2024 1515   [place] modified independence at 09/17/2024 1515   Time Frame 3-5 days at 09/17/2024 1515   Strategies/Barriers Edu on LH AE and adaptive dressing strategies. at 09/17/2024 1515   Progress/Outcome goal ongoing at 09/17/2024 1515

## (undated) DEVICE — SYRINGE DISP LUER-LOK 30 CC

## (undated) DEVICE — APPLICATOR CHLORAPREP 26ML ORANGE TINT

## (undated) DEVICE — Device

## (undated) DEVICE — DRAPE-U NON-STERILE

## (undated) DEVICE — SUTURE VICRYL PLUS 1 UNDYED 1X27" CP-1

## (undated) DEVICE — DRAPE POUCH IRRIGATION

## (undated) DEVICE — SET HANDPIECE INTERPULSE

## (undated) DEVICE — HOOD T7 PLUS

## (undated) DEVICE — WRAP COBAN LATEX FREE 6IN STERILE

## (undated) DEVICE — LIGHT STRIP DISPOSABLE

## (undated) DEVICE — COVER LIGHTHANDLE (STERILE SINGLE PA

## (undated) DEVICE — SUTURE VICRYL PLUS 2-0 UNDYED 1X27" PSL

## (undated) DEVICE — NEEDLE DISP HYPO 21GX1 1/2IN

## (undated) DEVICE — BLANKET UPPER BODY BAIR HUGGER

## (undated) DEVICE — DRAPE TOWEL 17X23 NON-STERILE

## (undated) DEVICE — ADHESIVE SKIN DERMABOND ADVANCED 0.7ML

## (undated) DEVICE — GLOVE SZ 8 LINER PROTEXIS PI BL

## (undated) DEVICE — GLOVE SZ 7.5 PROTEXIS PI

## (undated) DEVICE — SUTURE STRATAFIX 3-0 60CM MONOCRYL LUS UNDYED

## (undated) DEVICE — PAD GROUND ELECTROSURGICAL W/CORD

## (undated) DEVICE — DRAPE U/ SHT SPLIT PLASTIC ST 24/CS

## (undated) DEVICE — GOWN SIRUS FABRNF RAGLAN XL ST 28/CS

## (undated) DEVICE — DRESSING OPTIFOAM 4 X 10IN POST-OP

## (undated) DEVICE — BLADE SAGITTAL #127 STABLECUT

## (undated) DEVICE — TUBING SMOKE EVAC PENCIL COATED

## (undated) DEVICE — VEST STERILE

## (undated) DEVICE — DRAPE IOBAN

## (undated) DEVICE — DRAPE 3/4 REINFORCED 53X77 20/CS

## (undated) DEVICE — HOOD T7 PLUS W/PEEL AWAY SHIELD

## (undated) DEVICE — SOLN IRRIG STER WATER 1000ML

## (undated) DEVICE — SUCTION 18FR FRAZIER DISPOSABLE

## (undated) DEVICE — PACK TOTAL JOINT

## (undated) DEVICE — SOLUTION PROV-IODINE .75 OZ

## (undated) DEVICE — MANIFOLD FOUR PORT NEPTUNE

## (undated) DEVICE — SOLN IRRIG .9%SOD 1000ML

## (undated) DEVICE — SUTURE STRATAFIX PDO 1 CTX TAPER 36CM

## (undated) DEVICE — SLEEVE SCD COMFORT KNEE LENGTH MED